# Patient Record
Sex: FEMALE | Race: BLACK OR AFRICAN AMERICAN | NOT HISPANIC OR LATINO | Employment: STUDENT | RURAL
[De-identification: names, ages, dates, MRNs, and addresses within clinical notes are randomized per-mention and may not be internally consistent; named-entity substitution may affect disease eponyms.]

---
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
## 2022-01-26 ENCOUNTER — OFFICE VISIT (OUTPATIENT)
Dept: FAMILY MEDICINE | Facility: CLINIC | Age: 22
End: 2022-01-26
Payer: COMMERCIAL

## 2022-01-26 VITALS
SYSTOLIC BLOOD PRESSURE: 112 MMHG | HEART RATE: 71 BPM | OXYGEN SATURATION: 98 % | HEIGHT: 64 IN | DIASTOLIC BLOOD PRESSURE: 58 MMHG | TEMPERATURE: 99 F | WEIGHT: 156 LBS | BODY MASS INDEX: 26.63 KG/M2

## 2022-01-26 DIAGNOSIS — Z13.1 SCREENING FOR DIABETES MELLITUS: ICD-10-CM

## 2022-01-26 DIAGNOSIS — Z13.220 SCREENING FOR LIPOID DISORDERS: ICD-10-CM

## 2022-01-26 DIAGNOSIS — Z00.00 WELL ADULT EXAM: Primary | ICD-10-CM

## 2022-01-26 DIAGNOSIS — Z11.3 SCREENING FOR STDS (SEXUALLY TRANSMITTED DISEASES): ICD-10-CM

## 2022-01-26 LAB
CHOLEST SERPL-MCNC: 179 MG/DL (ref 0–200)
CHOLEST/HDLC SERPL: 2 {RATIO}
GLUCOSE SERPL-MCNC: 89 MG/DL (ref 74–106)
HDLC SERPL-MCNC: 91 MG/DL (ref 40–60)
HIV 1+O+2 AB SERPL QL: NORMAL
LDLC SERPL CALC-MCNC: 81 MG/DL
LDLC/HDLC SERPL: 0.9 {RATIO}
NONHDLC SERPL-MCNC: 88 MG/DL
SYPHILIS AB INTERPRETATION: NORMAL
TRIGL SERPL-MCNC: 34 MG/DL (ref 35–150)
VLDLC SERPL-MCNC: 7 MG/DL

## 2022-01-26 PROCEDURE — 86780 TREPONEMA PALLIDUM (SYPHILIS) ANTIBODY: ICD-10-PCS | Mod: ,,, | Performed by: CLINICAL MEDICAL LABORATORY

## 2022-01-26 PROCEDURE — 3078F DIAST BP <80 MM HG: CPT | Mod: CPTII,,, | Performed by: NURSE PRACTITIONER

## 2022-01-26 PROCEDURE — 86695 HERPES SIMPLEX 1 & 2 IGG: ICD-10-PCS | Mod: ,,, | Performed by: CLINICAL MEDICAL LABORATORY

## 2022-01-26 PROCEDURE — 86694 HERPES SIMPLEX NES ANTBDY: CPT | Mod: ,,, | Performed by: CLINICAL MEDICAL LABORATORY

## 2022-01-26 PROCEDURE — 80061 LIPID PANEL: ICD-10-PCS | Mod: ,,, | Performed by: CLINICAL MEDICAL LABORATORY

## 2022-01-26 PROCEDURE — 87491 CHLMYD TRACH DNA AMP PROBE: CPT | Mod: ,,, | Performed by: CLINICAL MEDICAL LABORATORY

## 2022-01-26 PROCEDURE — 80061 LIPID PANEL: CPT | Mod: ,,, | Performed by: CLINICAL MEDICAL LABORATORY

## 2022-01-26 PROCEDURE — 87389 HIV-1 AG W/HIV-1&-2 AB AG IA: CPT | Mod: ,,, | Performed by: CLINICAL MEDICAL LABORATORY

## 2022-01-26 PROCEDURE — 86780 TREPONEMA PALLIDUM: CPT | Mod: ,,, | Performed by: CLINICAL MEDICAL LABORATORY

## 2022-01-26 PROCEDURE — 99395 PREV VISIT EST AGE 18-39: CPT | Mod: ,,, | Performed by: NURSE PRACTITIONER

## 2022-01-26 PROCEDURE — 87389 HIV 1 / 2 ANTIBODY: ICD-10-PCS | Mod: ,,, | Performed by: CLINICAL MEDICAL LABORATORY

## 2022-01-26 PROCEDURE — 86695 HERPES SIMPLEX TYPE 1 TEST: CPT | Mod: ,,, | Performed by: CLINICAL MEDICAL LABORATORY

## 2022-01-26 PROCEDURE — 86694 HERPES SIMPLEX 1 & 2 IGM: ICD-10-PCS | Mod: ,,, | Performed by: CLINICAL MEDICAL LABORATORY

## 2022-01-26 PROCEDURE — 86696 HERPES SIMPLEX TYPE 2 TEST: CPT | Mod: ,,, | Performed by: CLINICAL MEDICAL LABORATORY

## 2022-01-26 PROCEDURE — 87591 N.GONORRHOEAE DNA AMP PROB: CPT | Mod: ,,, | Performed by: CLINICAL MEDICAL LABORATORY

## 2022-01-26 PROCEDURE — 1159F MED LIST DOCD IN RCRD: CPT | Mod: CPTII,,, | Performed by: NURSE PRACTITIONER

## 2022-01-26 PROCEDURE — 99395 PR PREVENTIVE VISIT,EST,18-39: ICD-10-PCS | Mod: ,,, | Performed by: NURSE PRACTITIONER

## 2022-01-26 PROCEDURE — 82947 ASSAY GLUCOSE BLOOD QUANT: CPT | Mod: ,,, | Performed by: CLINICAL MEDICAL LABORATORY

## 2022-01-26 PROCEDURE — 3008F PR BODY MASS INDEX (BMI) DOCUMENTED: ICD-10-PCS | Mod: CPTII,,, | Performed by: NURSE PRACTITIONER

## 2022-01-26 PROCEDURE — 87491 CHLAMYDIA/GONORRHOEAE(GC), PCR: ICD-10-PCS | Mod: ,,, | Performed by: CLINICAL MEDICAL LABORATORY

## 2022-01-26 PROCEDURE — 3074F PR MOST RECENT SYSTOLIC BLOOD PRESSURE < 130 MM HG: ICD-10-PCS | Mod: CPTII,,, | Performed by: NURSE PRACTITIONER

## 2022-01-26 PROCEDURE — 3078F PR MOST RECENT DIASTOLIC BLOOD PRESSURE < 80 MM HG: ICD-10-PCS | Mod: CPTII,,, | Performed by: NURSE PRACTITIONER

## 2022-01-26 PROCEDURE — 1160F RVW MEDS BY RX/DR IN RCRD: CPT | Mod: CPTII,,, | Performed by: NURSE PRACTITIONER

## 2022-01-26 PROCEDURE — 1160F PR REVIEW ALL MEDS BY PRESCRIBER/CLIN PHARMACIST DOCUMENTED: ICD-10-PCS | Mod: CPTII,,, | Performed by: NURSE PRACTITIONER

## 2022-01-26 PROCEDURE — 1159F PR MEDICATION LIST DOCUMENTED IN MEDICAL RECORD: ICD-10-PCS | Mod: CPTII,,, | Performed by: NURSE PRACTITIONER

## 2022-01-26 PROCEDURE — 86696 HERPES SIMPLEX 1 & 2 IGG: ICD-10-PCS | Mod: ,,, | Performed by: CLINICAL MEDICAL LABORATORY

## 2022-01-26 PROCEDURE — 82947 GLUCOSE, FASTING: ICD-10-PCS | Mod: ,,, | Performed by: CLINICAL MEDICAL LABORATORY

## 2022-01-26 PROCEDURE — 3008F BODY MASS INDEX DOCD: CPT | Mod: CPTII,,, | Performed by: NURSE PRACTITIONER

## 2022-01-26 PROCEDURE — 3074F SYST BP LT 130 MM HG: CPT | Mod: CPTII,,, | Performed by: NURSE PRACTITIONER

## 2022-01-26 PROCEDURE — 87591 CHLAMYDIA/GONORRHOEAE(GC), PCR: ICD-10-PCS | Mod: ,,, | Performed by: CLINICAL MEDICAL LABORATORY

## 2022-01-26 NOTE — PROGRESS NOTES
Subjective:       Patient ID: Chloe Wallace is a 21 y.o. female.    Chief Complaint: Annual Exam (Check up/ annual; blood work, STD (no symptoms), fasting)    22yo AAF presents to clinic for annual wellness exam and would like a full STD screening including HIV testing.  She denies any complaints or issues.    Review of Systems   Constitutional: Negative for fatigue.   HENT: Negative for ear pain and sore throat.    Eyes: Negative for pain, discharge and visual disturbance.   Respiratory: Negative for cough and shortness of breath.    Cardiovascular: Negative.    Gastrointestinal: Negative for abdominal pain, constipation and diarrhea.   Genitourinary: Negative for dysuria, menstrual problem and vaginal discharge.   Musculoskeletal: Negative for gait problem.   Skin: Negative.    Neurological: Negative for dizziness and light-headedness.         Objective:      Physical Exam  Vitals and nursing note reviewed.   Constitutional:       Appearance: Normal appearance. She is well-developed.   HENT:      Head: Normocephalic.      Right Ear: Hearing normal.      Left Ear: Hearing normal.      Nose: Nose normal.   Eyes:      General: Lids are normal.      Extraocular Movements: Extraocular movements intact.      Conjunctiva/sclera: Conjunctivae normal.      Pupils: Pupils are equal, round, and reactive to light.   Cardiovascular:      Rate and Rhythm: Normal rate and regular rhythm.      Heart sounds: Normal heart sounds.   Pulmonary:      Effort: Pulmonary effort is normal.      Breath sounds: Normal breath sounds.   Musculoskeletal:         General: Normal range of motion.      Cervical back: Normal range of motion and neck supple.      Right lower leg: No edema.      Left lower leg: No edema.   Skin:     General: Skin is warm and dry.   Neurological:      Mental Status: She is alert and oriented to person, place, and time.      Gait: Gait is intact.   Psychiatric:         Behavior: Behavior is cooperative.             Assessment:       Well adult exam    Screening for lipoid disorders  -     Lipid Panel; Future; Expected date: 01/26/2022    Screening for diabetes mellitus  -     Glucose, Fasting; Future; Expected date: 01/26/2022    Screening for STDs (sexually transmitted diseases)  -     Chlamydia/GC, PCR; Future; Expected date: 01/26/2022  -     HIV 1/2 Ag/Ab (4th Gen); Future; Expected date: 01/26/2022  -     Treponema Pallidum (Syphillis) Antibody; Future; Expected date: 01/26/2022  -     HSV 1 & 2, IgM; Future; Expected date: 01/26/2022  -     HSV 1 & 2, IgG; Future; Expected date: 01/26/2022             Plan:     Follow up if symptoms worsen or fail to improve.       Reviewed healthy diet and lifestyle modifications    Discussed safe sex practices

## 2022-01-27 ENCOUNTER — TELEPHONE (OUTPATIENT)
Dept: FAMILY MEDICINE | Facility: CLINIC | Age: 22
End: 2022-01-27
Payer: COMMERCIAL

## 2022-01-27 LAB
CHLAMYDIA BY PCR: NEGATIVE
HSV IGM SER QL IA: NEGATIVE
HSV TYPE 1 AB IGG INDEX: >8
HSV TYPE 2 AB IGG INDEX: 0.2
HSV1 IGG SER QL: POSITIVE
HSV2 IGG SER QL: NEGATIVE
N. GONORRHOEAE (GC) BY PCR: NEGATIVE

## 2022-01-27 NOTE — TELEPHONE ENCOUNTER
----- Message from GLENNA Quiroz sent at 1/27/2022  2:06 PM CST -----  Patient is positive for HSV I. All other labs are good.

## 2022-04-07 ENCOUNTER — OFFICE VISIT (OUTPATIENT)
Dept: GASTROENTEROLOGY | Facility: CLINIC | Age: 22
End: 2022-04-07
Payer: COMMERCIAL

## 2022-04-07 VITALS
HEIGHT: 64 IN | WEIGHT: 158 LBS | DIASTOLIC BLOOD PRESSURE: 74 MMHG | HEART RATE: 72 BPM | OXYGEN SATURATION: 97 % | SYSTOLIC BLOOD PRESSURE: 126 MMHG | BODY MASS INDEX: 26.98 KG/M2

## 2022-04-07 DIAGNOSIS — K59.00 CONSTIPATION, UNSPECIFIED CONSTIPATION TYPE: Primary | ICD-10-CM

## 2022-04-07 DIAGNOSIS — K92.1 HEMATOCHEZIA: ICD-10-CM

## 2022-04-07 PROCEDURE — 99214 OFFICE O/P EST MOD 30 MIN: CPT | Mod: ,,, | Performed by: NURSE PRACTITIONER

## 2022-04-07 PROCEDURE — 1159F MED LIST DOCD IN RCRD: CPT | Mod: CPTII,,, | Performed by: NURSE PRACTITIONER

## 2022-04-07 PROCEDURE — 1159F PR MEDICATION LIST DOCUMENTED IN MEDICAL RECORD: ICD-10-PCS | Mod: CPTII,,, | Performed by: NURSE PRACTITIONER

## 2022-04-07 PROCEDURE — 3008F PR BODY MASS INDEX (BMI) DOCUMENTED: ICD-10-PCS | Mod: CPTII,,, | Performed by: NURSE PRACTITIONER

## 2022-04-07 PROCEDURE — 99214 PR OFFICE/OUTPT VISIT, EST, LEVL IV, 30-39 MIN: ICD-10-PCS | Mod: ,,, | Performed by: NURSE PRACTITIONER

## 2022-04-07 PROCEDURE — 3074F SYST BP LT 130 MM HG: CPT | Mod: CPTII,,, | Performed by: NURSE PRACTITIONER

## 2022-04-07 PROCEDURE — 3074F PR MOST RECENT SYSTOLIC BLOOD PRESSURE < 130 MM HG: ICD-10-PCS | Mod: CPTII,,, | Performed by: NURSE PRACTITIONER

## 2022-04-07 PROCEDURE — 3078F DIAST BP <80 MM HG: CPT | Mod: CPTII,,, | Performed by: NURSE PRACTITIONER

## 2022-04-07 PROCEDURE — 3078F PR MOST RECENT DIASTOLIC BLOOD PRESSURE < 80 MM HG: ICD-10-PCS | Mod: CPTII,,, | Performed by: NURSE PRACTITIONER

## 2022-04-07 PROCEDURE — 3008F BODY MASS INDEX DOCD: CPT | Mod: CPTII,,, | Performed by: NURSE PRACTITIONER

## 2022-04-07 NOTE — PROGRESS NOTES
Chloe Wallace is a 21 y.o. female here for Constipation        PCP: Primary Doctor No  Referring Provider: No referring provider defined for this encounter.     HPI:  Presents for report of constipation. She is not taking a regular laxative. States she averages a bowel movement every 4 days. Reports intermittent hematochezia with straining to have a BM. No abdominal pain, weight loss, change in appetite or fatigue. Family history of colon cancer in her father. Father diagnosed at age 45. Also reports a grandmother and aunt with colon cancer. No history of IBD. Discussed flexible sigmoidoscopy for rectal bleeding. Patient declines at this time.         ROS:  Review of Systems   Constitutional: Negative for appetite change, fatigue, fever and unexpected weight change.   HENT: Negative for trouble swallowing.    Respiratory: Negative for shortness of breath and wheezing.    Cardiovascular: Negative for chest pain and palpitations.   Gastrointestinal: Positive for constipation. Negative for abdominal pain, blood in stool, change in bowel habit, diarrhea, nausea, vomiting, reflux and change in bowel habit.   Genitourinary: Negative for dysuria.   Musculoskeletal: Negative for back pain, gait problem and joint swelling.   Integumentary:  Negative for pallor.   Neurological: Negative for dizziness, weakness and light-headedness.   Psychiatric/Behavioral: The patient is not nervous/anxious.           PMHX:  has a past medical history of Perirectal abscess and Pilonidal abscess.    PSHX:  has a past surgical history that includes [place] tooth extraction and Incision and drainage of abscess (Right, 6/15/2021).    PFHX: family history includes Cancer in her father and paternal aunt.    PSlHX:  reports that she has never smoked. She has never used smokeless tobacco. She reports that she does not drink alcohol and does not use drugs.        Review of patient's allergies indicates:  No Known Allergies         Objective  "Findings:  Vital Signs:  /74   Pulse 72   Ht 5' 4" (1.626 m)   Wt 71.7 kg (158 lb)   LMP 04/06/2022   SpO2 97%   BMI 27.12 kg/m²  Body mass index is 27.12 kg/m².    Physical Exam:  Physical Exam  Vitals and nursing note reviewed.   Constitutional:       General: She is not in acute distress.     Appearance: Normal appearance. She is not ill-appearing.   HENT:      Mouth/Throat:      Mouth: Mucous membranes are moist.   Cardiovascular:      Rate and Rhythm: Normal rate and regular rhythm.      Heart sounds: No murmur heard.  Pulmonary:      Effort: Pulmonary effort is normal.      Breath sounds: No wheezing, rhonchi or rales.   Abdominal:      General: Bowel sounds are normal. There is no distension.      Palpations: Abdomen is soft. There is no mass.      Tenderness: There is no abdominal tenderness. There is no guarding or rebound.      Hernia: No hernia is present.   Musculoskeletal:      Right lower leg: No edema.      Left lower leg: No edema.   Skin:     General: Skin is warm and dry.      Coloration: Skin is not jaundiced or pale.      Findings: No bruising or rash.   Neurological:      Mental Status: She is alert and oriented to person, place, and time.   Psychiatric:         Mood and Affect: Mood normal.          Labs:  No results found for: WBC, HGB, HCT, MCV, RDW, PLT, GRAN, LYMPH, MONO, EOS, BASO  No results found for: NA, K, CL, CO2, GLU, BUN, CREATININE, CALCIUM, PROT, ALBUMIN, BILITOT, ALKPHOS, AST, ALT      Imaging: No results found.      Assessment:  Chloe Wallace is a 21 y.o. female here with:  1. Constipation, unspecified constipation type    2. Hematochezia          Recommendations:  1. TSH, Free t4, CBC, BMP  2. Miralax powder 17 grams in 8 ounces of liquid daily. On the 3rd day if no BM, MOM as directed  3. Offered Flex sig. Patient declines at this time  Drink 64 ounces of water daily  Exercise 150 minutes per week      Follow up in about 3 months (around 7/7/2022).      Order " summary:  Orders Placed This Encounter    T4, Free    CBC Auto Differential    Basic Metabolic Panel    TSH       Thank you for allowing me to participate in the care of Chloe Wallace.      CARROLL Joyce

## 2022-04-07 NOTE — PATIENT INSTRUCTIONS
Miralax 1 capful in 8 ounces liquid daily on 3rd day you can take Milk of Magnesia 2-3 tablets daily  Increase water intake to 64 ounces    Eat Kiwi fruit for constipation

## 2022-06-04 ENCOUNTER — OFFICE VISIT (OUTPATIENT)
Dept: FAMILY MEDICINE | Facility: CLINIC | Age: 22
End: 2022-06-04
Payer: COMMERCIAL

## 2022-06-04 VITALS
BODY MASS INDEX: 26.46 KG/M2 | OXYGEN SATURATION: 100 % | HEART RATE: 64 BPM | TEMPERATURE: 98 F | RESPIRATION RATE: 17 BRPM | WEIGHT: 155 LBS | SYSTOLIC BLOOD PRESSURE: 119 MMHG | HEIGHT: 64 IN | DIASTOLIC BLOOD PRESSURE: 83 MMHG

## 2022-06-04 DIAGNOSIS — B96.89 BV (BACTERIAL VAGINOSIS): ICD-10-CM

## 2022-06-04 DIAGNOSIS — N76.0 BV (BACTERIAL VAGINOSIS): ICD-10-CM

## 2022-06-04 DIAGNOSIS — Z11.3 SCREENING EXAMINATION FOR VENEREAL DISEASE: Primary | ICD-10-CM

## 2022-06-04 LAB
B-HCG UR QL: NEGATIVE
BILIRUB SERPL-MCNC: NEGATIVE MG/DL
BLOOD URINE, POC: NEGATIVE
CANDIDA SPECIES: NEGATIVE
CHLAMYDIA BY PCR: NEGATIVE
COLOR, POC UA: YELLOW
CTP QC/QA: YES
GARDNERELLA: POSITIVE
GLUCOSE UR QL STRIP: NEGATIVE
HIV 1+O+2 AB SERPL QL: NORMAL
KETONES UR QL STRIP: NEGATIVE
LEUKOCYTE ESTERASE URINE, POC: NEGATIVE
N. GONORRHOEAE (GC) BY PCR: NEGATIVE
NITRITE, POC UA: NEGATIVE
PH, POC UA: 7.5
PROTEIN, POC: NEGATIVE
SPECIFIC GRAVITY, POC UA: 1.02
SYPHILIS AB INTERPRETATION: NORMAL
TRICHOMONAS: NEGATIVE
UROBILINOGEN, POC UA: 0.2

## 2022-06-04 PROCEDURE — 86694 HERPES SIMPLEX 1 & 2 IGM: ICD-10-PCS | Mod: ,,, | Performed by: CLINICAL MEDICAL LABORATORY

## 2022-06-04 PROCEDURE — 86780 TREPONEMA PALLIDUM: CPT | Mod: ,,, | Performed by: CLINICAL MEDICAL LABORATORY

## 2022-06-04 PROCEDURE — 87510 GARDNER VAG DNA DIR PROBE: CPT | Mod: ,,, | Performed by: CLINICAL MEDICAL LABORATORY

## 2022-06-04 PROCEDURE — 86780 TREPONEMA PALLIDUM (SYPHILIS) ANTIBODY: ICD-10-PCS | Mod: ,,, | Performed by: CLINICAL MEDICAL LABORATORY

## 2022-06-04 PROCEDURE — 86696 HERPES SIMPLEX 1 & 2 IGG: ICD-10-PCS | Mod: ,,, | Performed by: CLINICAL MEDICAL LABORATORY

## 2022-06-04 PROCEDURE — 3008F PR BODY MASS INDEX (BMI) DOCUMENTED: ICD-10-PCS | Mod: CPTII,,, | Performed by: NURSE PRACTITIONER

## 2022-06-04 PROCEDURE — 86694 HERPES SIMPLEX NES ANTBDY: CPT | Mod: ,,, | Performed by: CLINICAL MEDICAL LABORATORY

## 2022-06-04 PROCEDURE — 81025 URINE PREGNANCY TEST: CPT | Mod: QW,,, | Performed by: NURSE PRACTITIONER

## 2022-06-04 PROCEDURE — 99213 OFFICE O/P EST LOW 20 MIN: CPT | Mod: ,,, | Performed by: NURSE PRACTITIONER

## 2022-06-04 PROCEDURE — 3079F PR MOST RECENT DIASTOLIC BLOOD PRESSURE 80-89 MM HG: ICD-10-PCS | Mod: CPTII,,, | Performed by: NURSE PRACTITIONER

## 2022-06-04 PROCEDURE — 86696 HERPES SIMPLEX TYPE 2 TEST: CPT | Mod: ,,, | Performed by: CLINICAL MEDICAL LABORATORY

## 2022-06-04 PROCEDURE — 87510 BACTERIAL VAGINOSIS: ICD-10-PCS | Mod: ,,, | Performed by: CLINICAL MEDICAL LABORATORY

## 2022-06-04 PROCEDURE — 86695 HERPES SIMPLEX 1 & 2 IGG: ICD-10-PCS | Mod: ,,, | Performed by: CLINICAL MEDICAL LABORATORY

## 2022-06-04 PROCEDURE — 81025 POCT URINE PREGNANCY: ICD-10-PCS | Mod: QW,,, | Performed by: NURSE PRACTITIONER

## 2022-06-04 PROCEDURE — 99213 PR OFFICE/OUTPT VISIT, EST, LEVL III, 20-29 MIN: ICD-10-PCS | Mod: ,,, | Performed by: NURSE PRACTITIONER

## 2022-06-04 PROCEDURE — 87389 HIV-1 AG W/HIV-1&-2 AB AG IA: CPT | Mod: ,,, | Performed by: CLINICAL MEDICAL LABORATORY

## 2022-06-04 PROCEDURE — 87491 CHLMYD TRACH DNA AMP PROBE: CPT | Mod: ,,, | Performed by: CLINICAL MEDICAL LABORATORY

## 2022-06-04 PROCEDURE — 3008F BODY MASS INDEX DOCD: CPT | Mod: CPTII,,, | Performed by: NURSE PRACTITIONER

## 2022-06-04 PROCEDURE — 87591 N.GONORRHOEAE DNA AMP PROB: CPT | Mod: ,,, | Performed by: CLINICAL MEDICAL LABORATORY

## 2022-06-04 PROCEDURE — 81003 URINALYSIS AUTO W/O SCOPE: CPT | Mod: QW,,, | Performed by: NURSE PRACTITIONER

## 2022-06-04 PROCEDURE — 99051 PR MEDICAL SERVICES, EVE/WKEND/HOLIDAY: ICD-10-PCS | Mod: ,,, | Performed by: NURSE PRACTITIONER

## 2022-06-04 PROCEDURE — 99051 MED SERV EVE/WKEND/HOLIDAY: CPT | Mod: ,,, | Performed by: NURSE PRACTITIONER

## 2022-06-04 PROCEDURE — 3079F DIAST BP 80-89 MM HG: CPT | Mod: CPTII,,, | Performed by: NURSE PRACTITIONER

## 2022-06-04 PROCEDURE — 87660 TRICHOMONAS VAGIN DIR PROBE: CPT | Mod: ,,, | Performed by: CLINICAL MEDICAL LABORATORY

## 2022-06-04 PROCEDURE — 87389 HIV 1 / 2 ANTIBODY: ICD-10-PCS | Mod: ,,, | Performed by: CLINICAL MEDICAL LABORATORY

## 2022-06-04 PROCEDURE — 87480 CANDIDA DNA DIR PROBE: CPT | Mod: ,,, | Performed by: CLINICAL MEDICAL LABORATORY

## 2022-06-04 PROCEDURE — 87491 CHLAMYDIA/GONORRHOEAE(GC), PCR: ICD-10-PCS | Mod: ,,, | Performed by: CLINICAL MEDICAL LABORATORY

## 2022-06-04 PROCEDURE — 87480 BACTERIAL VAGINOSIS: ICD-10-PCS | Mod: ,,, | Performed by: CLINICAL MEDICAL LABORATORY

## 2022-06-04 PROCEDURE — 3074F PR MOST RECENT SYSTOLIC BLOOD PRESSURE < 130 MM HG: ICD-10-PCS | Mod: CPTII,,, | Performed by: NURSE PRACTITIONER

## 2022-06-04 PROCEDURE — 86695 HERPES SIMPLEX TYPE 1 TEST: CPT | Mod: ,,, | Performed by: CLINICAL MEDICAL LABORATORY

## 2022-06-04 PROCEDURE — 81003 POCT URINALYSIS W/O SCOPE: ICD-10-PCS | Mod: QW,,, | Performed by: NURSE PRACTITIONER

## 2022-06-04 PROCEDURE — 3074F SYST BP LT 130 MM HG: CPT | Mod: CPTII,,, | Performed by: NURSE PRACTITIONER

## 2022-06-04 PROCEDURE — 87591 CHLAMYDIA/GONORRHOEAE(GC), PCR: ICD-10-PCS | Mod: ,,, | Performed by: CLINICAL MEDICAL LABORATORY

## 2022-06-04 PROCEDURE — 87660 BACTERIAL VAGINOSIS: ICD-10-PCS | Mod: ,,, | Performed by: CLINICAL MEDICAL LABORATORY

## 2022-06-06 ENCOUNTER — TELEPHONE (OUTPATIENT)
Dept: FAMILY MEDICINE | Facility: CLINIC | Age: 22
End: 2022-06-06
Payer: COMMERCIAL

## 2022-06-06 RX ORDER — METRONIDAZOLE 500 MG/1
500 TABLET ORAL EVERY 12 HOURS
Qty: 14 TABLET | Refills: 0 | Status: SHIPPED | OUTPATIENT
Start: 2022-06-06 | End: 2022-06-13

## 2022-06-06 NOTE — TELEPHONE ENCOUNTER
Identify patient by name and . Results was given. Responded well and voiced understanding.----- Message from GLENNA Balderas sent at 2022  8:19 AM CDT -----  Please notify- I sent in flagyl to treat

## 2022-06-07 LAB
HSV IGM SER QL IA: NEGATIVE
HSV TYPE 1 AB IGG INDEX: 8.66
HSV TYPE 2 AB IGG INDEX: 0.09
HSV1 IGG SER QL: POSITIVE
HSV2 IGG SER QL: NEGATIVE

## 2023-03-09 ENCOUNTER — OFFICE VISIT (OUTPATIENT)
Dept: FAMILY MEDICINE | Facility: CLINIC | Age: 23
End: 2023-03-09
Payer: COMMERCIAL

## 2023-03-09 VITALS
RESPIRATION RATE: 16 BRPM | OXYGEN SATURATION: 98 % | SYSTOLIC BLOOD PRESSURE: 112 MMHG | TEMPERATURE: 98 F | WEIGHT: 156 LBS | BODY MASS INDEX: 26.63 KG/M2 | DIASTOLIC BLOOD PRESSURE: 72 MMHG | HEART RATE: 60 BPM | HEIGHT: 64 IN

## 2023-03-09 DIAGNOSIS — N89.8 VAGINAL DISCHARGE: Primary | ICD-10-CM

## 2023-03-09 DIAGNOSIS — N89.8 VAGINAL IRRITATION: ICD-10-CM

## 2023-03-09 LAB
BILIRUB SERPL-MCNC: NEGATIVE MG/DL
BLOOD URINE, POC: NEGATIVE
CANDIDA SPECIES: NEGATIVE
COLOR, POC UA: YELLOW
GARDNERELLA: POSITIVE
GLUCOSE UR QL STRIP: NEGATIVE
KETONES UR QL STRIP: NEGATIVE
LEUKOCYTE ESTERASE URINE, POC: NEGATIVE
NITRITE, POC UA: NEGATIVE
PH, POC UA: 6.5
PROTEIN, POC: NEGATIVE
SPECIFIC GRAVITY, POC UA: 1.02
TRICHOMONAS: NEGATIVE
UROBILINOGEN, POC UA: 0.2

## 2023-03-09 PROCEDURE — 3008F PR BODY MASS INDEX (BMI) DOCUMENTED: ICD-10-PCS | Mod: ,,, | Performed by: NURSE PRACTITIONER

## 2023-03-09 PROCEDURE — 3074F SYST BP LT 130 MM HG: CPT | Mod: ,,, | Performed by: NURSE PRACTITIONER

## 2023-03-09 PROCEDURE — 3078F PR MOST RECENT DIASTOLIC BLOOD PRESSURE < 80 MM HG: ICD-10-PCS | Mod: ,,, | Performed by: NURSE PRACTITIONER

## 2023-03-09 PROCEDURE — 87510 GARDNER VAG DNA DIR PROBE: CPT | Mod: ,,, | Performed by: CLINICAL MEDICAL LABORATORY

## 2023-03-09 PROCEDURE — 1160F RVW MEDS BY RX/DR IN RCRD: CPT | Mod: ,,, | Performed by: NURSE PRACTITIONER

## 2023-03-09 PROCEDURE — 87491 CHLAMYDIA/GONORRHOEAE(GC), PCR: ICD-10-PCS | Mod: ,,, | Performed by: CLINICAL MEDICAL LABORATORY

## 2023-03-09 PROCEDURE — 87591 CHLAMYDIA/GONORRHOEAE(GC), PCR: ICD-10-PCS | Mod: ,,, | Performed by: CLINICAL MEDICAL LABORATORY

## 2023-03-09 PROCEDURE — 3008F BODY MASS INDEX DOCD: CPT | Mod: ,,, | Performed by: NURSE PRACTITIONER

## 2023-03-09 PROCEDURE — 1160F PR REVIEW ALL MEDS BY PRESCRIBER/CLIN PHARMACIST DOCUMENTED: ICD-10-PCS | Mod: ,,, | Performed by: NURSE PRACTITIONER

## 2023-03-09 PROCEDURE — 87660 TRICHOMONAS VAGIN DIR PROBE: CPT | Mod: ,,, | Performed by: CLINICAL MEDICAL LABORATORY

## 2023-03-09 PROCEDURE — 87510 BACTERIAL VAGINOSIS: ICD-10-PCS | Mod: ,,, | Performed by: CLINICAL MEDICAL LABORATORY

## 2023-03-09 PROCEDURE — 1159F PR MEDICATION LIST DOCUMENTED IN MEDICAL RECORD: ICD-10-PCS | Mod: ,,, | Performed by: NURSE PRACTITIONER

## 2023-03-09 PROCEDURE — 87491 CHLMYD TRACH DNA AMP PROBE: CPT | Mod: ,,, | Performed by: CLINICAL MEDICAL LABORATORY

## 2023-03-09 PROCEDURE — 87480 CANDIDA DNA DIR PROBE: CPT | Mod: ,,, | Performed by: CLINICAL MEDICAL LABORATORY

## 2023-03-09 PROCEDURE — 87480 BACTERIAL VAGINOSIS: ICD-10-PCS | Mod: ,,, | Performed by: CLINICAL MEDICAL LABORATORY

## 2023-03-09 PROCEDURE — 1159F MED LIST DOCD IN RCRD: CPT | Mod: ,,, | Performed by: NURSE PRACTITIONER

## 2023-03-09 PROCEDURE — 87591 N.GONORRHOEAE DNA AMP PROB: CPT | Mod: ,,, | Performed by: CLINICAL MEDICAL LABORATORY

## 2023-03-09 PROCEDURE — 81003 POCT URINALYSIS W/O SCOPE: ICD-10-PCS | Mod: QW,,, | Performed by: NURSE PRACTITIONER

## 2023-03-09 PROCEDURE — 87660 BACTERIAL VAGINOSIS: ICD-10-PCS | Mod: ,,, | Performed by: CLINICAL MEDICAL LABORATORY

## 2023-03-09 PROCEDURE — 99214 OFFICE O/P EST MOD 30 MIN: CPT | Mod: ,,, | Performed by: NURSE PRACTITIONER

## 2023-03-09 PROCEDURE — 81003 URINALYSIS AUTO W/O SCOPE: CPT | Mod: QW,,, | Performed by: NURSE PRACTITIONER

## 2023-03-09 PROCEDURE — 3078F DIAST BP <80 MM HG: CPT | Mod: ,,, | Performed by: NURSE PRACTITIONER

## 2023-03-09 PROCEDURE — 99214 PR OFFICE/OUTPT VISIT, EST, LEVL IV, 30-39 MIN: ICD-10-PCS | Mod: ,,, | Performed by: NURSE PRACTITIONER

## 2023-03-09 PROCEDURE — 3074F PR MOST RECENT SYSTOLIC BLOOD PRESSURE < 130 MM HG: ICD-10-PCS | Mod: ,,, | Performed by: NURSE PRACTITIONER

## 2023-03-09 RX ORDER — METRONIDAZOLE 500 MG/1
500 TABLET ORAL 2 TIMES DAILY
Qty: 14 TABLET | Refills: 0 | Status: SHIPPED | OUTPATIENT
Start: 2023-03-09 | End: 2023-03-16

## 2023-03-09 NOTE — PROGRESS NOTES
"Subjective:       Patient ID: Chloe Wallace is a 22 y.o. female.    Chief Complaint: Vaginal Discharge (Complaint of irritation and slight odor; white discharge x2weeks)    Presents to clinic as above. LMP 3/1/23 and normal. Does not want blood drawn. Just wants GC/Chlamydia and affirm.     Review of Systems   Constitutional: Negative.    Respiratory: Negative.     Cardiovascular: Negative.    Gastrointestinal:  Negative for abdominal pain.   Genitourinary: Negative.    Skin: Negative.         Reviewed family, medical, surgical, and social history.    Objective:      /72   Pulse 60   Temp 98.2 °F (36.8 °C)   Resp 16   Ht 5' 4" (1.626 m)   Wt 70.8 kg (156 lb)   LMP 03/02/2023 (Exact Date)   SpO2 98%   BMI 26.78 kg/m²   Physical Exam  Vitals and nursing note reviewed.   Constitutional:       General: She is not in acute distress.     Appearance: Normal appearance. She is not ill-appearing, toxic-appearing or diaphoretic.   Cardiovascular:      Rate and Rhythm: Normal rate and regular rhythm.      Heart sounds: Normal heart sounds.   Pulmonary:      Effort: Pulmonary effort is normal.      Breath sounds: Normal breath sounds.   Abdominal:      General: Abdomen is flat. Bowel sounds are normal. There is no distension.      Palpations: Abdomen is soft. There is no mass.      Tenderness: There is no abdominal tenderness. There is no right CVA tenderness, left CVA tenderness, guarding or rebound.      Hernia: No hernia is present.   Skin:     General: Skin is warm and dry.      Capillary Refill: Capillary refill takes less than 2 seconds.   Neurological:      Mental Status: She is alert and oriented to person, place, and time.   Psychiatric:         Mood and Affect: Mood normal.         Behavior: Behavior normal.         Thought Content: Thought content normal.         Judgment: Judgment normal.          No visits with results within 1 Day(s) from this visit.   Latest known visit with results is:   Lab " Requisition on 01/24/2023   Component Date Value Ref Range Status    COVID-19 Ag 01/24/2023 Negative  Negative, Invalid Final      Assessment:       1. Vaginal discharge    2. Vaginal irritation          Plan:       Vaginal discharge  -     Bacterial Vaginosis; Future; Expected date: 03/09/2023  -     metroNIDAZOLE (FLAGYL) 500 MG tablet; Take 1 tablet (500 mg total) by mouth 2 (two) times daily. for 7 days  Dispense: 14 tablet; Refill: 0  -     Chlamydia/GC, PCR; Future; Expected date: 03/09/2023    Vaginal irritation  -     POCT URINALYSIS W/O SCOPE  -     metroNIDAZOLE (FLAGYL) 500 MG tablet; Take 1 tablet (500 mg total) by mouth 2 (two) times daily. for 7 days  Dispense: 14 tablet; Refill: 0    I will call when results back  RTC PRN          Risks, benefits, and side effects were discussed with the patient. All questions were answered to the fullest satisfaction of the patient, and pt verbalized understanding and agreement to treatment plan. Pt was to call with any new or worsening symptoms, or present to the ER.

## 2023-03-10 LAB
CHLAMYDIA BY PCR: NEGATIVE
N. GONORRHOEAE (GC) BY PCR: NEGATIVE

## 2023-03-11 ENCOUNTER — TELEPHONE (OUTPATIENT)
Dept: FAMILY MEDICINE | Facility: CLINIC | Age: 23
End: 2023-03-11
Payer: COMMERCIAL

## 2023-03-11 NOTE — TELEPHONE ENCOUNTER
Pt viewed results on my chart.----- Message from GLENNA Sharif sent at 3/9/2023  4:10 PM CST -----  Notify of BV. She has flagyl. Thanks

## 2023-05-17 ENCOUNTER — OFFICE VISIT (OUTPATIENT)
Dept: FAMILY MEDICINE | Facility: CLINIC | Age: 23
End: 2023-05-17
Payer: COMMERCIAL

## 2023-05-17 VITALS
DIASTOLIC BLOOD PRESSURE: 78 MMHG | BODY MASS INDEX: 26.46 KG/M2 | HEIGHT: 64 IN | SYSTOLIC BLOOD PRESSURE: 115 MMHG | OXYGEN SATURATION: 98 % | WEIGHT: 155 LBS | HEART RATE: 75 BPM

## 2023-05-17 DIAGNOSIS — Z11.3 SCREENING EXAMINATION FOR STD (SEXUALLY TRANSMITTED DISEASE): Primary | ICD-10-CM

## 2023-05-17 LAB
HIV 1+O+2 AB SERPL QL: NORMAL
SYPHILIS AB INTERPRETATION: NORMAL

## 2023-05-17 PROCEDURE — 1160F PR REVIEW ALL MEDS BY PRESCRIBER/CLIN PHARMACIST DOCUMENTED: ICD-10-PCS | Mod: ,,, | Performed by: NURSE PRACTITIONER

## 2023-05-17 PROCEDURE — 99212 OFFICE O/P EST SF 10 MIN: CPT | Mod: ,,, | Performed by: NURSE PRACTITIONER

## 2023-05-17 PROCEDURE — 86696 HERPES SIMPLEX 1 & 2 IGG: ICD-10-PCS | Mod: ,,, | Performed by: CLINICAL MEDICAL LABORATORY

## 2023-05-17 PROCEDURE — 3074F SYST BP LT 130 MM HG: CPT | Mod: ,,, | Performed by: NURSE PRACTITIONER

## 2023-05-17 PROCEDURE — 1159F MED LIST DOCD IN RCRD: CPT | Mod: ,,, | Performed by: NURSE PRACTITIONER

## 2023-05-17 PROCEDURE — 1160F RVW MEDS BY RX/DR IN RCRD: CPT | Mod: ,,, | Performed by: NURSE PRACTITIONER

## 2023-05-17 PROCEDURE — 3008F BODY MASS INDEX DOCD: CPT | Mod: ,,, | Performed by: NURSE PRACTITIONER

## 2023-05-17 PROCEDURE — 86694 HERPES SIMPLEX NES ANTBDY: CPT | Mod: ,,, | Performed by: CLINICAL MEDICAL LABORATORY

## 2023-05-17 PROCEDURE — 86695 HERPES SIMPLEX TYPE 1 TEST: CPT | Mod: ,,, | Performed by: CLINICAL MEDICAL LABORATORY

## 2023-05-17 PROCEDURE — 3074F PR MOST RECENT SYSTOLIC BLOOD PRESSURE < 130 MM HG: ICD-10-PCS | Mod: ,,, | Performed by: NURSE PRACTITIONER

## 2023-05-17 PROCEDURE — 87389 HIV-1 AG W/HIV-1&-2 AB AG IA: CPT | Mod: ,,, | Performed by: CLINICAL MEDICAL LABORATORY

## 2023-05-17 PROCEDURE — 99212 PR OFFICE/OUTPT VISIT, EST, LEVL II, 10-19 MIN: ICD-10-PCS | Mod: ,,, | Performed by: NURSE PRACTITIONER

## 2023-05-17 PROCEDURE — 3078F DIAST BP <80 MM HG: CPT | Mod: ,,, | Performed by: NURSE PRACTITIONER

## 2023-05-17 PROCEDURE — 1159F PR MEDICATION LIST DOCUMENTED IN MEDICAL RECORD: ICD-10-PCS | Mod: ,,, | Performed by: NURSE PRACTITIONER

## 2023-05-17 PROCEDURE — 87491 CHLAMYDIA/GONORRHOEAE(GC), PCR: ICD-10-PCS | Mod: ,,, | Performed by: CLINICAL MEDICAL LABORATORY

## 2023-05-17 PROCEDURE — 86780 TREPONEMA PALLIDUM: CPT | Mod: ,,, | Performed by: CLINICAL MEDICAL LABORATORY

## 2023-05-17 PROCEDURE — 86694 HERPES SIMPLEX 1 & 2 IGM: ICD-10-PCS | Mod: ,,, | Performed by: CLINICAL MEDICAL LABORATORY

## 2023-05-17 PROCEDURE — 3008F PR BODY MASS INDEX (BMI) DOCUMENTED: ICD-10-PCS | Mod: ,,, | Performed by: NURSE PRACTITIONER

## 2023-05-17 PROCEDURE — 87389 HIV 1 / 2 ANTIBODY: ICD-10-PCS | Mod: ,,, | Performed by: CLINICAL MEDICAL LABORATORY

## 2023-05-17 PROCEDURE — 3078F PR MOST RECENT DIASTOLIC BLOOD PRESSURE < 80 MM HG: ICD-10-PCS | Mod: ,,, | Performed by: NURSE PRACTITIONER

## 2023-05-17 PROCEDURE — 87591 N.GONORRHOEAE DNA AMP PROB: CPT | Mod: ,,, | Performed by: CLINICAL MEDICAL LABORATORY

## 2023-05-17 PROCEDURE — 86780 TREPONEMA PALLIDUM (SYPHILIS) ANTIBODY: ICD-10-PCS | Mod: ,,, | Performed by: CLINICAL MEDICAL LABORATORY

## 2023-05-17 PROCEDURE — 86696 HERPES SIMPLEX TYPE 2 TEST: CPT | Mod: ,,, | Performed by: CLINICAL MEDICAL LABORATORY

## 2023-05-17 PROCEDURE — 86695 HERPES SIMPLEX 1 & 2 IGG: ICD-10-PCS | Mod: ,,, | Performed by: CLINICAL MEDICAL LABORATORY

## 2023-05-17 PROCEDURE — 87591 CHLAMYDIA/GONORRHOEAE(GC), PCR: ICD-10-PCS | Mod: ,,, | Performed by: CLINICAL MEDICAL LABORATORY

## 2023-05-17 PROCEDURE — 87491 CHLMYD TRACH DNA AMP PROBE: CPT | Mod: ,,, | Performed by: CLINICAL MEDICAL LABORATORY

## 2023-05-17 NOTE — PROGRESS NOTES
[facility]    Chief Complaint      Chief Complaint   Patient presents with    Exposure to STD     No known exposure pt wants to get her blood drawn fro stds and stis with no sysm       History of Present Illness      Chloe Wallace is a 22 y.o. female. She  has a past medical history of Perirectal abscess and Pilonidal abscess., who presents today for STD screening.  States no symptoms- reports recently having a wet prep done so does not need that.    Past Medical History:  Past Medical History:   Diagnosis Date    Perirectal abscess     Pilonidal abscess        Past Surgical History:   has a past surgical history that includes [place] tooth extraction and Incision and drainage of abscess (Right, 6/15/2021).    Social History:  Social History     Tobacco Use    Smoking status: Never    Smokeless tobacco: Never   Substance Use Topics    Alcohol use: Never    Drug use: Never       I personally reviewed all past medical, surgical, and social.     Review of Systems   Constitutional:  Negative for chills, fatigue and fever.   HENT:  Negative for sore throat.    Respiratory:  Negative for cough and shortness of breath.    Cardiovascular: Negative.    Gastrointestinal:  Negative for abdominal pain, nausea and vomiting.   Genitourinary:  Negative for dysuria and vaginal discharge.   Musculoskeletal:  Negative for myalgias.   Skin: Negative.       Medications:  No outpatient encounter medications on file as of 5/17/2023.     No facility-administered encounter medications on file as of 5/17/2023.       Allergies:  Review of patient's allergies indicates:  No Known Allergies    Health Maintenance:    There is no immunization history on file for this patient.   Health Maintenance   Topic Date Due    Hepatitis C Screening  Never done    HPV Vaccines (1 - 2-dose series) Never done    TETANUS VACCINE  Never done    Chlamydia Screening  03/09/2024    Lipid Panel  Completed        Physical Exam      Vital Signs  Pulse:  "75  SpO2: 98 %  BP: 115/78  Height and Weight  Height: 5' 4" (162.6 cm)  Weight: 70.3 kg (155 lb)  BSA (Calculated - sq m): 1.78 sq meters  BMI (Calculated): 26.6  Weight in (lb) to have BMI = 25: 145.3]    Physical Exam  Vitals and nursing note reviewed.   Constitutional:       Appearance: Normal appearance. She is well-developed.   HENT:      Head: Normocephalic.      Right Ear: Hearing normal.      Left Ear: Hearing normal.      Nose: Nose normal.   Eyes:      General: Lids are normal.      Conjunctiva/sclera: Conjunctivae normal.   Cardiovascular:      Rate and Rhythm: Normal rate and regular rhythm.      Heart sounds: Normal heart sounds.   Pulmonary:      Effort: Pulmonary effort is normal.      Breath sounds: Normal breath sounds.   Musculoskeletal:         General: Normal range of motion.      Cervical back: Normal range of motion and neck supple.   Skin:     General: Skin is warm and dry.   Neurological:      Mental Status: She is alert and oriented to person, place, and time.      Gait: Gait is intact.   Psychiatric:         Behavior: Behavior is cooperative.        Laboratory:  CBC:  Recent Labs   Lab 04/07/22  0853   WBC 5.46   RBC 4.41   Hemoglobin 13.1   Hematocrit 40.0   Platelet Count 291   MCV 90.7   MCH 29.7   MCHC 32.8     CMP:  Recent Labs   Lab 04/07/22  0853   Glucose 91   Calcium 9.7   Sodium 136   Potassium 4.3   CO2 27   Chloride 107   BUN 13     LIPIDS:  Recent Labs   Lab 01/26/22  0925 04/07/22  0853   TSH  --  1.090   HDL Cholesterol 91 H  --    Cholesterol 179  --    Triglycerides 34 L  --    LDL Calculated 81  --    Cholesterol/HDL Ratio (Risk Factor) 2.0  --    Non-HDL 88  --      TSH:  Recent Labs   Lab 04/07/22  0853   TSH 1.090     A1C:        Assessment/Plan     Chloe Wallace is a 22 y.o.female with:     1. Screening examination for STD (sexually transmitted disease)  - HIV 1/2 Ag/Ab (4th Gen); Future  - Treponema Pallidum (Syphillis) Antibody; Future  - HSV 1 & 2, IgM; Future  - " HSV 1 & 2, IgG; Future  - Chlamydia/GC, PCR; Future     Educated on safe sex practices  Will call with lab results      Total time spent face-to-face and non-face-to-face coordinating care for this encounter was:  10  min    Chronic conditions status updated as per HPI.  Other than changes above, cont current medications and maintain follow up with specialists.  Return to clinic prn if symptoms worsen or fail to improve.    Renetta Gilman, ADEELP  [facility]

## 2023-05-18 LAB
CHLAMYDIA BY PCR: NEGATIVE
HSV IGM SER QL IA: NEGATIVE
HSV TYPE 1 AB IGG INDEX: 6.59
HSV TYPE 2 AB IGG INDEX: 0.11
HSV1 IGG SER QL: POSITIVE
HSV2 IGG SER QL: NEGATIVE
N. GONORRHOEAE (GC) BY PCR: NEGATIVE

## 2023-05-26 ENCOUNTER — TELEPHONE (OUTPATIENT)
Dept: FAMILY MEDICINE | Facility: CLINIC | Age: 23
End: 2023-05-26
Payer: COMMERCIAL

## 2023-05-26 NOTE — TELEPHONE ENCOUNTER
----- Message from GLENNA Quiroz sent at 5/23/2023  7:11 PM CDT -----  Has HSV 1- everything else is negative

## 2023-06-07 ENCOUNTER — OFFICE VISIT (OUTPATIENT)
Dept: FAMILY MEDICINE | Facility: CLINIC | Age: 23
End: 2023-06-07
Payer: COMMERCIAL

## 2023-06-07 VITALS
TEMPERATURE: 99 F | RESPIRATION RATE: 18 BRPM | DIASTOLIC BLOOD PRESSURE: 71 MMHG | WEIGHT: 157 LBS | BODY MASS INDEX: 26.8 KG/M2 | OXYGEN SATURATION: 100 % | HEART RATE: 56 BPM | SYSTOLIC BLOOD PRESSURE: 108 MMHG | HEIGHT: 64 IN

## 2023-06-07 DIAGNOSIS — M54.50 ACUTE RIGHT-SIDED LOW BACK PAIN WITHOUT SCIATICA: ICD-10-CM

## 2023-06-07 DIAGNOSIS — R11.10 VOMITING, UNSPECIFIED VOMITING TYPE, UNSPECIFIED WHETHER NAUSEA PRESENT: Primary | ICD-10-CM

## 2023-06-07 DIAGNOSIS — K59.00 CONSTIPATION, UNSPECIFIED CONSTIPATION TYPE: ICD-10-CM

## 2023-06-07 LAB
B-HCG UR QL: NEGATIVE
BILIRUB SERPL-MCNC: NORMAL MG/DL
BLOOD URINE, POC: NORMAL
COLOR, POC UA: YELLOW
CTP QC/QA: YES
GLUCOSE UR QL STRIP: NEGATIVE
KETONES UR QL STRIP: NORMAL
LEUKOCYTE ESTERASE URINE, POC: NEGATIVE
NITRITE, POC UA: NEGATIVE
PH, POC UA: 6
PROTEIN, POC: NEGATIVE
SPECIFIC GRAVITY, POC UA: >=1.03
UROBILINOGEN, POC UA: 0.2

## 2023-06-07 PROCEDURE — 81025 URINE PREGNANCY TEST: CPT | Mod: QW,,, | Performed by: NURSE PRACTITIONER

## 2023-06-07 PROCEDURE — 3074F PR MOST RECENT SYSTOLIC BLOOD PRESSURE < 130 MM HG: ICD-10-PCS | Mod: ,,, | Performed by: NURSE PRACTITIONER

## 2023-06-07 PROCEDURE — 1159F PR MEDICATION LIST DOCUMENTED IN MEDICAL RECORD: ICD-10-PCS | Mod: ,,, | Performed by: NURSE PRACTITIONER

## 2023-06-07 PROCEDURE — 3078F PR MOST RECENT DIASTOLIC BLOOD PRESSURE < 80 MM HG: ICD-10-PCS | Mod: ,,, | Performed by: NURSE PRACTITIONER

## 2023-06-07 PROCEDURE — 99213 PR OFFICE/OUTPT VISIT, EST, LEVL III, 20-29 MIN: ICD-10-PCS | Mod: ,,, | Performed by: NURSE PRACTITIONER

## 2023-06-07 PROCEDURE — 3078F DIAST BP <80 MM HG: CPT | Mod: ,,, | Performed by: NURSE PRACTITIONER

## 2023-06-07 PROCEDURE — 1159F MED LIST DOCD IN RCRD: CPT | Mod: ,,, | Performed by: NURSE PRACTITIONER

## 2023-06-07 PROCEDURE — 3008F BODY MASS INDEX DOCD: CPT | Mod: ,,, | Performed by: NURSE PRACTITIONER

## 2023-06-07 PROCEDURE — 81003 POCT URINALYSIS W/O SCOPE: ICD-10-PCS | Mod: QW,,, | Performed by: NURSE PRACTITIONER

## 2023-06-07 PROCEDURE — 81025 POCT URINE PREGNANCY: ICD-10-PCS | Mod: QW,,, | Performed by: NURSE PRACTITIONER

## 2023-06-07 PROCEDURE — 3074F SYST BP LT 130 MM HG: CPT | Mod: ,,, | Performed by: NURSE PRACTITIONER

## 2023-06-07 PROCEDURE — 81003 URINALYSIS AUTO W/O SCOPE: CPT | Mod: QW,,, | Performed by: NURSE PRACTITIONER

## 2023-06-07 PROCEDURE — 3008F PR BODY MASS INDEX (BMI) DOCUMENTED: ICD-10-PCS | Mod: ,,, | Performed by: NURSE PRACTITIONER

## 2023-06-07 PROCEDURE — 99213 OFFICE O/P EST LOW 20 MIN: CPT | Mod: ,,, | Performed by: NURSE PRACTITIONER

## 2023-06-07 RX ORDER — POLYETHYLENE GLYCOL 3350 17 G/17G
17 POWDER, FOR SOLUTION ORAL DAILY
Qty: 289 G | Refills: 2 | Status: SHIPPED | OUTPATIENT
Start: 2023-06-07 | End: 2024-01-03

## 2023-06-07 NOTE — LETTER
June 7, 2023      [facility]  [address] [place] [address]  Phone: [phone]  Fax: [phone]       Patient: Chloe Wallace   YOB: 2000  Date of Visit: 06/07/2023    To Whom It May Concern:    Michelle Wallace  was at [facility] on 06/07/2023. The patient may return to work/school on 06/08/2023 without restrictions. If you have any questions or concerns, or if I can be of further assistance, please do not hesitate to contact me.    Sincerely,    GLENNA Balderas

## 2023-06-07 NOTE — PROGRESS NOTES
Subjective:       Patient ID: Chloe Wallace is a 22 y.o. female.    Chief Complaint: Back Pain (Lower back pain on right side ongoing since Monday. Pt states that the pain radiates to her stomach. ), Diarrhea (Started Monday, lasted for 1 day.), and Emesis (Started Monday, lasted for 1 day.)    Back Pain (Lower back pain on right side ongoing since Monday. Pt states that the pain radiates to her stomach. ), Diarrhea (Started Monday, lasted for 1 day.), and Emesis (Started Monday, lasted for 1 day.) Hx of constipation      Back Pain  Pertinent negatives include no abdominal pain, chest pain, dysuria, fever, headaches, pelvic pain or weakness.   Diarrhea   Pertinent negatives include no abdominal pain, chills, coughing, fever, headaches or vomiting.   Emesis   Pertinent negatives include no abdominal pain, chest pain, chills, coughing, diarrhea, dizziness, fever or headaches.   Review of Systems   Constitutional:  Negative for appetite change, chills, fatigue and fever.   HENT:  Negative for nasal congestion, ear pain and sore throat.    Eyes:  Negative for pain, discharge and itching.   Respiratory:  Negative for cough and shortness of breath.    Cardiovascular:  Negative for chest pain and leg swelling.   Gastrointestinal:  Negative for abdominal pain, change in bowel habit, diarrhea, nausea, vomiting and change in bowel habit.   Genitourinary:  Negative for dysuria, flank pain, frequency, genital sores, pelvic pain, urgency, vaginal discharge and vaginal pain.   Musculoskeletal:  Positive for back pain. Negative for gait problem and neck pain.   Integumentary:  Negative for rash and wound.   Allergic/Immunologic: Negative for immunocompromised state.   Neurological:  Negative for dizziness, weakness and headaches.   All other systems reviewed and are negative.      Objective:      Physical Exam  Vitals and nursing note reviewed.   Constitutional:       General: She is not in acute distress.     Appearance: Normal  appearance. She is not ill-appearing, toxic-appearing or diaphoretic.   HENT:      Head: Normocephalic.      Mouth/Throat:      Mouth: Mucous membranes are moist.   Eyes:      General: No scleral icterus.        Right eye: No discharge.         Left eye: No discharge.      Extraocular Movements: Extraocular movements intact.      Conjunctiva/sclera: Conjunctivae normal.      Pupils: Pupils are equal, round, and reactive to light.   Cardiovascular:      Rate and Rhythm: Regular rhythm. Bradycardia present.      Pulses: Normal pulses.      Heart sounds: Normal heart sounds. No murmur heard.  Pulmonary:      Effort: Pulmonary effort is normal. No respiratory distress.      Breath sounds: Normal breath sounds. No wheezing, rhonchi or rales.   Abdominal:      General: Bowel sounds are decreased.      Palpations: Abdomen is soft.      Tenderness: There is no abdominal tenderness. There is no right CVA tenderness, left CVA tenderness, guarding or rebound.   Musculoskeletal:         General: Tenderness present. Normal range of motion.      Cervical back: Neck supple. No tenderness.      Lumbar back: Tenderness present. No swelling, edema or spasms. Normal range of motion. Negative right straight leg raise test and negative left straight leg raise test.        Back:    Lymphadenopathy:      Cervical: No cervical adenopathy.   Skin:     General: Skin is warm and dry.      Capillary Refill: Capillary refill takes less than 2 seconds.      Findings: No rash.   Neurological:      Mental Status: She is alert and oriented to person, place, and time.   Psychiatric:         Mood and Affect: Mood normal.         Behavior: Behavior normal.         Thought Content: Thought content normal.         Judgment: Judgment normal.          Assessment:       1. Vomiting, unspecified vomiting type, unspecified whether nausea present        Plan:   Vomiting, unspecified vomiting type, unspecified whether nausea present  -     POCT URINALYSIS W/O  SCOPE  -     POCT urine pregnancy  -     X-Ray KUB; Future; Expected date: 06/07/2023         Risks, benefits, and side effects were discussed with the patient. All questions were answered to the fullest satisfaction of the patient, and pt verbalized understanding and agreement to treatment plan. Pt was to call with any new or worsening symptoms, or present to the ER

## 2023-09-06 ENCOUNTER — OFFICE VISIT (OUTPATIENT)
Dept: FAMILY MEDICINE | Facility: CLINIC | Age: 23
End: 2023-09-06
Payer: COMMERCIAL

## 2023-09-06 VITALS
BODY MASS INDEX: 27.71 KG/M2 | HEIGHT: 64 IN | SYSTOLIC BLOOD PRESSURE: 109 MMHG | HEART RATE: 61 BPM | TEMPERATURE: 98 F | OXYGEN SATURATION: 99 % | DIASTOLIC BLOOD PRESSURE: 71 MMHG | WEIGHT: 162.31 LBS

## 2023-09-06 DIAGNOSIS — N89.8 VAGINAL DISCHARGE: Primary | ICD-10-CM

## 2023-09-06 PROCEDURE — 87591 CHLAMYDIA/GONORRHOEAE(GC), PCR: ICD-10-PCS | Mod: ,,, | Performed by: CLINICAL MEDICAL LABORATORY

## 2023-09-06 PROCEDURE — 99213 OFFICE O/P EST LOW 20 MIN: CPT | Mod: ,,, | Performed by: STUDENT IN AN ORGANIZED HEALTH CARE EDUCATION/TRAINING PROGRAM

## 2023-09-06 PROCEDURE — 87591 N.GONORRHOEAE DNA AMP PROB: CPT | Mod: ,,, | Performed by: CLINICAL MEDICAL LABORATORY

## 2023-09-06 PROCEDURE — 3078F PR MOST RECENT DIASTOLIC BLOOD PRESSURE < 80 MM HG: ICD-10-PCS | Mod: ,,, | Performed by: STUDENT IN AN ORGANIZED HEALTH CARE EDUCATION/TRAINING PROGRAM

## 2023-09-06 PROCEDURE — 1159F PR MEDICATION LIST DOCUMENTED IN MEDICAL RECORD: ICD-10-PCS | Mod: ,,, | Performed by: STUDENT IN AN ORGANIZED HEALTH CARE EDUCATION/TRAINING PROGRAM

## 2023-09-06 PROCEDURE — 3074F SYST BP LT 130 MM HG: CPT | Mod: ,,, | Performed by: STUDENT IN AN ORGANIZED HEALTH CARE EDUCATION/TRAINING PROGRAM

## 2023-09-06 PROCEDURE — 3008F BODY MASS INDEX DOCD: CPT | Mod: ,,, | Performed by: STUDENT IN AN ORGANIZED HEALTH CARE EDUCATION/TRAINING PROGRAM

## 2023-09-06 PROCEDURE — 3078F DIAST BP <80 MM HG: CPT | Mod: ,,, | Performed by: STUDENT IN AN ORGANIZED HEALTH CARE EDUCATION/TRAINING PROGRAM

## 2023-09-06 PROCEDURE — 3008F PR BODY MASS INDEX (BMI) DOCUMENTED: ICD-10-PCS | Mod: ,,, | Performed by: STUDENT IN AN ORGANIZED HEALTH CARE EDUCATION/TRAINING PROGRAM

## 2023-09-06 PROCEDURE — 1159F MED LIST DOCD IN RCRD: CPT | Mod: ,,, | Performed by: STUDENT IN AN ORGANIZED HEALTH CARE EDUCATION/TRAINING PROGRAM

## 2023-09-06 PROCEDURE — 87491 CHLAMYDIA/GONORRHOEAE(GC), PCR: ICD-10-PCS | Mod: ,,, | Performed by: CLINICAL MEDICAL LABORATORY

## 2023-09-06 PROCEDURE — 99213 PR OFFICE/OUTPT VISIT, EST, LEVL III, 20-29 MIN: ICD-10-PCS | Mod: ,,, | Performed by: STUDENT IN AN ORGANIZED HEALTH CARE EDUCATION/TRAINING PROGRAM

## 2023-09-06 PROCEDURE — 3074F PR MOST RECENT SYSTOLIC BLOOD PRESSURE < 130 MM HG: ICD-10-PCS | Mod: ,,, | Performed by: STUDENT IN AN ORGANIZED HEALTH CARE EDUCATION/TRAINING PROGRAM

## 2023-09-06 PROCEDURE — 87491 CHLMYD TRACH DNA AMP PROBE: CPT | Mod: ,,, | Performed by: CLINICAL MEDICAL LABORATORY

## 2023-09-06 NOTE — PATIENT INSTRUCTIONS
Even if you do not have any signs of illness, you may spread an STD.  Having an STD can increase your chances of catching HIV, the virus that causes AIDS.  If you have any type of sexual contact, use latex condoms each time to reduce the spread of infection. Use a condom with each partner every time, from the start of sex to the end.  Avoid contact with any sex partner known to have an infection or who has signs of an infection like genital sores or warts.  Avoid multiple sex partners. A long-term monogamous relationship with a partner who has tested negative and is known to have no infection is the safest partner.  If you are pregnant, get tested and get prompt treatment for an STD. This will help avoid passing it to your baby.  Avoid using drugs or too much alcohol. Either of these can lead to risky behavior like unprotected sex.  Talk to your partner about STDs before you have sex. Talk about having safe sex and if your relationship is monogamous.  Wash your hands and genitals with soap and water after sex.  See your doctor for regular exams and check-ups for STDs.  Talk to your doctor about available vaccines for prevention of certain STDs.  No sexual activity pending test results, treatment if needed, and resolution of symptoms

## 2023-09-06 NOTE — PROGRESS NOTES
[facility]    Chief Complaint      Chief Complaint   Patient presents with    Vaginal Discharge     Pt believes she has BV, vaginal discharge started about two weeks ago. Vaginal discharge is thinner & it's darker white.        History of Present Illness      Chloe Wallace is a 23 y.o. female with chronic conditions of constipation who presents today for vaginal discharge, wants to be checked for BV along with other STDs.  No confirmed exposure.  Lmp 08/24/2023 no vaginal itching or irritation     Vaginal Discharge  The patient's primary symptoms include vaginal discharge (thin watery discharge). Pertinent negatives include no dysuria or fever.       Past Medical History:  Past Medical History:   Diagnosis Date    Perirectal abscess     Pilonidal abscess        Past Surgical History:   has a past surgical history that includes [place] tooth extraction and Incision and drainage of abscess (Right, 6/15/2021).    Social History:  Social History     Tobacco Use    Smoking status: Never     Passive exposure: Never    Smokeless tobacco: Never   Substance Use Topics    Alcohol use: Yes    Drug use: Never       I personally reviewed all past medical, surgical, and social.     Review of Systems   Constitutional:  Negative for fever.   Respiratory:  Negative for shortness of breath.    Cardiovascular:  Negative for chest pain.   Genitourinary:  Positive for vaginal discharge (thin watery discharge). Negative for dyspareunia, dysuria, genital sores and vaginal pain.        Medications:  Outpatient Encounter Medications as of 9/6/2023   Medication Sig Dispense Refill    polyethylene glycol (GLYCOLAX) 17 gram/dose powder Take 17 g by mouth once daily. (Patient not taking: Reported on 9/6/2023) 289 g 2     No facility-administered encounter medications on file as of 9/6/2023.       Allergies:  Review of patient's allergies indicates:  No Known Allergies    Health Maintenance:    There is no immunization history on file  "for this patient.   Health Maintenance   Topic Date Due    Hepatitis C Screening  Never done    HPV Vaccines (1 - 2-dose series) Never done    TETANUS VACCINE  Never done    Pap Smear  07/08/2023    Chlamydia Screening  05/17/2024    Lipid Panel  Completed        Physical Exam      Vital Signs  Temp: 98.2 °F (36.8 °C)  Temp Source: Oral  Pulse: 61  SpO2: 99 %  BP: 109/71  BP Location: Left arm  Patient Position: Sitting  Height and Weight  Height: 5' 4" (162.6 cm)  Weight: 73.6 kg (162 lb 4.8 oz)  BSA (Calculated - sq m): 1.82 sq meters  BMI (Calculated): 27.8  Weight in (lb) to have BMI = 25: 145.3]    Physical Exam  Constitutional:       Appearance: Normal appearance.   Cardiovascular:      Rate and Rhythm: Normal rate and regular rhythm.   Pulmonary:      Effort: Pulmonary effort is normal.      Breath sounds: Normal breath sounds.   Abdominal:      General: Bowel sounds are normal.      Palpations: Abdomen is soft.   Skin:     General: Skin is warm and dry.   Neurological:      Mental Status: She is alert.          Laboratory:  CBC:  Recent Labs   Lab 04/07/22  0853   WBC 5.46   RBC 4.41   Hemoglobin 13.1   Hematocrit 40.0   Platelet Count 291   MCV 90.7   MCH 29.7   MCHC 32.8     CMP:  Recent Labs   Lab 04/07/22  0853   Glucose 91   Calcium 9.7   Sodium 136   Potassium 4.3   CO2 27   Chloride 107   BUN 13     LIPIDS:  Recent Labs   Lab 01/26/22  0925 04/07/22  0853   TSH  --  1.090   HDL Cholesterol 91 H  --    Cholesterol 179  --    Triglycerides 34 L  --    LDL Calculated 81  --    Cholesterol/HDL Ratio (Risk Factor) 2.0  --    Non-HDL 88  --      TSH:  Recent Labs   Lab 04/07/22  0853   TSH 1.090     A1C:        Assessment/Plan     Chloe Wallace is a 23 y.o.female with:    1. Vaginal discharge  -     Bacterial Vaginosis; Future; Expected date: 09/06/2023  -     Chlamydia/GC, PCR; Future; Expected date: 09/06/2023         Chronic conditions status updated as per HPI.  Other than changes above, cont current " medications and maintain follow up with specialists.  Return to clinic as needed.     Patient Instructions   Even if you do not have any signs of illness, you may spread an STD.  Having an STD can increase your chances of catching HIV, the virus that causes AIDS.  If you have any type of sexual contact, use latex condoms each time to reduce the spread of infection. Use a condom with each partner every time, from the start of sex to the end.  Avoid contact with any sex partner known to have an infection or who has signs of an infection like genital sores or warts.  Avoid multiple sex partners. A long-term monogamous relationship with a partner who has tested negative and is known to have no infection is the safest partner.  If you are pregnant, get tested and get prompt treatment for an STD. This will help avoid passing it to your baby.  Avoid using drugs or too much alcohol. Either of these can lead to risky behavior like unprotected sex.  Talk to your partner about STDs before you have sex. Talk about having safe sex and if your relationship is monogamous.  Wash your hands and genitals with soap and water after sex.  See your doctor for regular exams and check-ups for STDs.  Talk to your doctor about available vaccines for prevention of certain STDs.  No sexual activity pending test results, treatment if needed, and resolution of symptoms      Rocío Lund, [facility]

## 2023-09-07 ENCOUNTER — TELEPHONE (OUTPATIENT)
Dept: FAMILY MEDICINE | Facility: CLINIC | Age: 23
End: 2023-09-07
Payer: COMMERCIAL

## 2023-09-07 LAB
CHLAMYDIA BY PCR: NEGATIVE
N. GONORRHOEAE (GC) BY PCR: NEGATIVE

## 2023-09-07 NOTE — TELEPHONE ENCOUNTER
Called pt to let her know that it was an issues processing her swab yesterday & we needed her to stop to do another. Pt stated that was ok. I explained that we're be her until 7 & this would be the only way for her to get treated properly. Pt stated ok, never really said she was coming.

## 2024-01-03 ENCOUNTER — OFFICE VISIT (OUTPATIENT)
Dept: FAMILY MEDICINE | Facility: CLINIC | Age: 24
End: 2024-01-03
Payer: COMMERCIAL

## 2024-01-03 VITALS
HEART RATE: 75 BPM | TEMPERATURE: 98 F | RESPIRATION RATE: 19 BRPM | SYSTOLIC BLOOD PRESSURE: 107 MMHG | OXYGEN SATURATION: 100 % | DIASTOLIC BLOOD PRESSURE: 71 MMHG | BODY MASS INDEX: 27.66 KG/M2 | HEIGHT: 64 IN | WEIGHT: 162 LBS

## 2024-01-03 DIAGNOSIS — H10.12 ALLERGIC CONJUNCTIVITIS OF LEFT EYE: Primary | ICD-10-CM

## 2024-01-03 PROCEDURE — 3078F DIAST BP <80 MM HG: CPT | Mod: ,,, | Performed by: NURSE PRACTITIONER

## 2024-01-03 PROCEDURE — 3074F SYST BP LT 130 MM HG: CPT | Mod: ,,, | Performed by: NURSE PRACTITIONER

## 2024-01-03 PROCEDURE — 1159F MED LIST DOCD IN RCRD: CPT | Mod: ,,, | Performed by: NURSE PRACTITIONER

## 2024-01-03 PROCEDURE — 99213 OFFICE O/P EST LOW 20 MIN: CPT | Mod: ,,, | Performed by: NURSE PRACTITIONER

## 2024-01-03 PROCEDURE — 3008F BODY MASS INDEX DOCD: CPT | Mod: ,,, | Performed by: NURSE PRACTITIONER

## 2024-01-03 RX ORDER — CETIRIZINE HYDROCHLORIDE 10 MG/1
10 TABLET ORAL NIGHTLY
Qty: 90 TABLET | Refills: 0 | Status: SHIPPED | OUTPATIENT
Start: 2024-01-03 | End: 2025-01-02

## 2024-01-03 RX ORDER — FLUTICASONE PROPIONATE 50 MCG
2 SPRAY, SUSPENSION (ML) NASAL DAILY
Qty: 18.2 ML | Refills: 0 | Status: SHIPPED | OUTPATIENT
Start: 2024-01-03

## 2024-01-03 NOTE — PROGRESS NOTES
"Subjective:       Patient ID: Chloe Wallace is a 23 y.o. female.    Vitals:  height is 5' 4" (1.626 m) and weight is 73.5 kg (162 lb). Her temperature is 98.4 °F (36.9 °C). Her blood pressure is 107/71 and her pulse is 75. Her respiration is 19 and oxygen saturation is 100%.     Chief Complaint: Eye Problem (Left eye watery x 1 month/Taking OTC allergy relief)    Eye Problem   The left eye is affected. This is a recurrent problem. The current episode started 1 to 4 weeks ago. The problem occurs constantly. The problem has been waxing and waning. There was no injury mechanism. The pain is at a severity of 0/10. The patient is experiencing no pain. There is No known exposure to pink eye. She Does not wear contacts. Associated symptoms include blurred vision and an eye discharge (watery). Pertinent negatives include no double vision, eye redness, fever, foreign body sensation, itching, nausea, photophobia, recent URI or vomiting. She has tried eye drops (OTC Claritin) for the symptoms. The treatment provided no relief.       Constitution: Negative for fatigue and fever.   HENT:  Negative for congestion.    Eyes:  Positive for eye discharge (watery) and blurred vision. Negative for eye itching, eye pain, eye redness, photophobia and double vision.   Gastrointestinal:  Negative for nausea and vomiting.   Allergic/Immunologic: Positive for environmental allergies and seasonal allergies. Negative for recurrent sinus infections.         Objective:      Physical Exam  Vitals reviewed.   Constitutional:       Appearance: Normal appearance.   HENT:      Nose:      Right Turbinates: Swollen.      Left Turbinates: Swollen.      Mouth/Throat:      Mouth: Mucous membranes are moist.   Eyes:      General: Lids are normal.         Left eye: No discharge or hordeolum.      Extraocular Movements:      Left eye: Normal extraocular motion and no nystagmus.      Conjunctiva/sclera: Conjunctivae normal.      Left eye: Left conjunctiva " is not injected. No exudate or hemorrhage.  Cardiovascular:      Rate and Rhythm: Normal rate and regular rhythm.      Heart sounds: No murmur heard.  Pulmonary:      Effort: Pulmonary effort is normal.      Breath sounds: Normal breath sounds.   Neurological:      Mental Status: She is alert and oriented to person, place, and time.   Psychiatric:         Mood and Affect: Mood normal.         Behavior: Behavior normal.         Thought Content: Thought content normal.         Judgment: Judgment normal.              Assessment:       1. Allergic conjunctivitis of left eye          No results found for this or any previous visit (from the past 24 hour(s)).     Plan:       -Recommended removal of artifical eyelashes.  -Encouraged scheduling eye exam ASAP.     Allergic conjunctivitis of left eye  -     cetirizine (ZYRTEC) 10 MG tablet; Take 1 tablet (10 mg total) by mouth every evening.  Dispense: 90 tablet; Refill: 0  -     fluticasone propionate (FLONASE) 50 mcg/actuation nasal spray; 2 sprays (100 mcg total) by Each Nostril route once daily.  Dispense: 18.2 mL; Refill: 0        Follow up if symptoms worsen or fail to improve.     No future appointments.     An After Visit Summary was printed and given to the patient.     Signature:    GLENNA Sharp  Family Nurse Practitioner  [facility]    Date of encounter: 1/3/24

## 2024-08-26 ENCOUNTER — OFFICE VISIT (OUTPATIENT)
Dept: OBSTETRICS AND GYNECOLOGY | Facility: CLINIC | Age: 24
End: 2024-08-26
Payer: COMMERCIAL

## 2024-08-26 VITALS
OXYGEN SATURATION: 99 % | WEIGHT: 165 LBS | RESPIRATION RATE: 18 BRPM | TEMPERATURE: 99 F | BODY MASS INDEX: 28.17 KG/M2 | SYSTOLIC BLOOD PRESSURE: 139 MMHG | HEIGHT: 64 IN | DIASTOLIC BLOOD PRESSURE: 82 MMHG | HEART RATE: 55 BPM

## 2024-08-26 DIAGNOSIS — Z01.419 ENCOUNTER FOR WELL WOMAN EXAM WITH ROUTINE GYNECOLOGICAL EXAM: Primary | ICD-10-CM

## 2024-08-26 DIAGNOSIS — Z72.51 HIGH RISK SEXUAL BEHAVIOR, UNSPECIFIED TYPE: ICD-10-CM

## 2024-08-26 LAB
CANDIDA SPECIES: NEGATIVE
GARDNERELLA: POSITIVE
HAV IGM SER QL: NORMAL
HBV CORE IGM SER QL: NORMAL
HBV SURFACE AG SERPL QL IA: NORMAL
HCV AB SER QL: NORMAL
HIV 1+O+2 AB SERPL QL: NORMAL
SYPHILIS AB INTERPRETATION: NORMAL
TRICHOMONAS: NEGATIVE

## 2024-08-26 PROCEDURE — 87480 CANDIDA DNA DIR PROBE: CPT | Mod: ,,, | Performed by: CLINICAL MEDICAL LABORATORY

## 2024-08-26 PROCEDURE — 1159F MED LIST DOCD IN RCRD: CPT | Mod: ,,, | Performed by: ADVANCED PRACTICE MIDWIFE

## 2024-08-26 PROCEDURE — 99385 PREV VISIT NEW AGE 18-39: CPT | Mod: ,,, | Performed by: ADVANCED PRACTICE MIDWIFE

## 2024-08-26 PROCEDURE — 3079F DIAST BP 80-89 MM HG: CPT | Mod: ,,, | Performed by: ADVANCED PRACTICE MIDWIFE

## 2024-08-26 PROCEDURE — 88142 CYTOPATH C/V THIN LAYER: CPT | Mod: TC,GCY | Performed by: ADVANCED PRACTICE MIDWIFE

## 2024-08-26 PROCEDURE — 80074 ACUTE HEPATITIS PANEL: CPT | Mod: ,,, | Performed by: CLINICAL MEDICAL LABORATORY

## 2024-08-26 PROCEDURE — 87591 N.GONORRHOEAE DNA AMP PROB: CPT | Mod: ,,, | Performed by: CLINICAL MEDICAL LABORATORY

## 2024-08-26 PROCEDURE — 87491 CHLMYD TRACH DNA AMP PROBE: CPT | Mod: ,,, | Performed by: CLINICAL MEDICAL LABORATORY

## 2024-08-26 PROCEDURE — 87389 HIV-1 AG W/HIV-1&-2 AB AG IA: CPT | Mod: ,,, | Performed by: CLINICAL MEDICAL LABORATORY

## 2024-08-26 PROCEDURE — 86780 TREPONEMA PALLIDUM: CPT | Mod: ,,, | Performed by: CLINICAL MEDICAL LABORATORY

## 2024-08-26 PROCEDURE — 99459 PELVIC EXAMINATION: CPT | Mod: ,,, | Performed by: ADVANCED PRACTICE MIDWIFE

## 2024-08-26 PROCEDURE — 87510 GARDNER VAG DNA DIR PROBE: CPT | Mod: ,,, | Performed by: CLINICAL MEDICAL LABORATORY

## 2024-08-26 PROCEDURE — 3008F BODY MASS INDEX DOCD: CPT | Mod: ,,, | Performed by: ADVANCED PRACTICE MIDWIFE

## 2024-08-26 PROCEDURE — 87660 TRICHOMONAS VAGIN DIR PROBE: CPT | Mod: ,,, | Performed by: CLINICAL MEDICAL LABORATORY

## 2024-08-26 PROCEDURE — 3075F SYST BP GE 130 - 139MM HG: CPT | Mod: ,,, | Performed by: ADVANCED PRACTICE MIDWIFE

## 2024-08-26 PROCEDURE — 36415 COLL VENOUS BLD VENIPUNCTURE: CPT | Mod: ,,, | Performed by: ADVANCED PRACTICE MIDWIFE

## 2024-08-26 NOTE — PROGRESS NOTES
"CC: Here for pap smear, annual exam    Chloe Wallace is a 24 y.o. female  presents for well woman exam.  LMP: Patient's last menstrual period was 2024 (exact date).. Menses are: monthly, medium flow, lasts 5 days, with small blood clots, and averages 3 pads/day with menstrual cycles . States was sexually active and partner did not use a condom. She does not desire birth control. Denies any further issues, problems, or complaints.    Last mammogram: n/a  Colonoscopy: n/a    Past Medical History:   Diagnosis Date    Perirectal abscess     Pilonidal abscess      Past Surgical History:   Procedure Laterality Date    INCISION AND DRAINAGE OF ABSCESS Right 6/15/2021    Procedure: INCISION AND DRAINAGE, ABSCESS;  Surgeon: Gabino Allred MD;  Location: [facility];  Service: General;  Laterality: Right;  Buttocks and right upper thigh    [place]IS[place] TOOTH EXTRACTION       Social History     Socioeconomic History    Marital status: Single   Tobacco Use    Smoking status: Never     Passive exposure: Never    Smokeless tobacco: Never   Substance and Sexual Activity    Alcohol use: Yes    Drug use: Never    Sexual activity: Yes     Partners: Male     Birth control/protection: Condom     Family History   Problem Relation Name Age of Onset    Hypertension Father      Colon cancer Father      Ovarian cancer Other paternal Great aunt     Colon cancer Other paternal Great aunt     Colon cancer Other Paternal Great GM      OB History          0    Para   0    Term   0       0    AB   0    Living   0         SAB   0    IAB   0    Ectopic   0    Multiple   0    Live Births   0                 /82   Pulse (!) 55   Temp 98.6 °F (37 °C) (Oral)   Resp 18   Ht 5' 4" (1.626 m)   Wt 74.8 kg (165 lb)   LMP 2024 (Exact Date)   SpO2 99%   BMI 28.32 kg/m²       ROS:  GENERAL: Denies weight gain or weight loss. Feeling well overall.   SKIN: Denies rash or lesions.   HEAD: Denies head injury or headache. "   NODES: Denies enlarged lymph nodes.   CHEST: Denies chest pain or shortness of breath.   CARDIOVASCULAR: Denies palpitations or left sided chest pain.   ABDOMEN: No abdominal pain, constipation, diarrhea, nausea, vomiting or rectal bleeding.   URINARY: No frequency, dysuria, hematuria, or burning on urination.  REPRODUCTIVE: See HPI.   BREASTS: The patient performs breast self-examination and denies pain, lumps, or nipple discharge.   HEMATOLOGIC: No easy bruisability or excessive bleeding.   MUSCULOSKELETAL: Denies joint pain or swelling.   NEUROLOGIC: Denies syncope or weakness.   PSYCHIATRIC: Denies depression, anxiety or mood swings.    PHYSICAL EXAM:  APPEARANCE: Well nourished, well developed, in no acute distress.  AFFECT: WNL, alert and oriented x 3  SKIN: No acne or hirsutism  NECK: Neck symmetric without masses or thyromegaly  NODES: No inguinal, cervical, axillary, or femoral lymph node enlargement  CHEST: Good respiratory effect  ABDOMEN: Soft.  No tenderness or masses.  No hepatosplenomegaly.  No hernias.  BREASTS: Symmetrical, no skin changes or visible lesions.  No palpable masses, nipple discharge bilaterally.  PELVIC: Normal external genitalia without lesions.  Normal hair distribution.  Adequate perineal body, normal urethral meatus.  Vagina moist and well rugated without lesions, with thin white discharge.  Cervix pink, without lesions, tenderness with thin white discharge.  No significant cystocele or rectocele.  Bimanual exam shows uterus to be normal size, regular, mobile and nontender.  Adnexa without masses or tenderness.  Chaperone present for exam  EXTREMITIES: No edema.    Encounter for well woman exam with routine gynecological exam  -     ThinPrep Pap Test; Future; Expected date: 08/26/2024    High risk sexual behavior, unspecified type  -     HIV 1/2 Ag/Ab (4th Gen); Future; Expected date: 08/26/2024  -     Chlamydia/GC, PCR; Future; Expected date: 08/26/2024  -     Hepatitis Panel,  Acute; Future; Expected date: 08/26/2024  -     Bacterial Vaginosis  -     Syphilis Antibody with reflex to RPR; Future; Expected date: 08/26/2024        ICD-10-CM ICD-9-CM    1. Encounter for well woman exam with routine gynecological exam  Z01.419 V72.31 ThinPrep Pap Test      2. High risk sexual behavior, unspecified type  Z72.51 V69.2 HIV 1/2 Ag/Ab (4th Gen)      Chlamydia/GC, PCR      Hepatitis Panel, Acute      Bacterial Vaginosis      Syphilis Antibody with reflex to RPR          Patient was counseled today on A.C.S. Pap guidelines and recommendations for yearly pelvic exams, mammograms and monthly self breast exams; to see her PCP for other health maintenance.   Exercise regimen encouraged  Healthy food choices encouraged  Multivitamins daily  Vitamin D daily  States will obtain results from [facility]  Questions answered to desired level of satisfaction  Verbalized understanding to all information and instructions    Follow up in about 1 year (around 8/26/2025), or if symptoms worsen or fail to improve, for annual exam.

## 2024-08-27 LAB
CHLAMYDIA BY PCR: POSITIVE
N. GONORRHOEAE (GC) BY PCR: NEGATIVE

## 2024-08-28 LAB
GH SERPL-MCNC: NORMAL NG/ML
INSULIN SERPL-ACNC: NORMAL U[IU]/ML
LAB AP CLINICAL INFORMATION: NORMAL
LAB AP GYN INTERPRETATION: NEGATIVE
LAB AP PAP DISCLAIMER COMMENTS: NORMAL
RENIN PLAS-CCNC: NORMAL NG/ML/H

## 2024-09-04 ENCOUNTER — OFFICE VISIT (OUTPATIENT)
Dept: FAMILY MEDICINE | Facility: CLINIC | Age: 24
End: 2024-09-04
Payer: COMMERCIAL

## 2024-09-04 VITALS
BODY MASS INDEX: 28.68 KG/M2 | RESPIRATION RATE: 17 BRPM | OXYGEN SATURATION: 99 % | WEIGHT: 168 LBS | DIASTOLIC BLOOD PRESSURE: 74 MMHG | TEMPERATURE: 98 F | HEART RATE: 60 BPM | HEIGHT: 64 IN | SYSTOLIC BLOOD PRESSURE: 117 MMHG

## 2024-09-04 DIAGNOSIS — N76.0 BV (BACTERIAL VAGINOSIS): ICD-10-CM

## 2024-09-04 DIAGNOSIS — A74.9 CHLAMYDIA: Primary | ICD-10-CM

## 2024-09-04 DIAGNOSIS — B96.89 BV (BACTERIAL VAGINOSIS): ICD-10-CM

## 2024-09-04 PROCEDURE — 3078F DIAST BP <80 MM HG: CPT | Mod: ,,, | Performed by: NURSE PRACTITIONER

## 2024-09-04 PROCEDURE — 1159F MED LIST DOCD IN RCRD: CPT | Mod: ,,, | Performed by: NURSE PRACTITIONER

## 2024-09-04 PROCEDURE — 99213 OFFICE O/P EST LOW 20 MIN: CPT | Mod: ,,, | Performed by: NURSE PRACTITIONER

## 2024-09-04 PROCEDURE — 3008F BODY MASS INDEX DOCD: CPT | Mod: ,,, | Performed by: NURSE PRACTITIONER

## 2024-09-04 PROCEDURE — 3074F SYST BP LT 130 MM HG: CPT | Mod: ,,, | Performed by: NURSE PRACTITIONER

## 2024-09-04 RX ORDER — METRONIDAZOLE 500 MG/1
500 TABLET ORAL EVERY 12 HOURS
Qty: 14 TABLET | Refills: 0 | Status: SHIPPED | OUTPATIENT
Start: 2024-09-04 | End: 2024-09-11

## 2024-09-04 RX ORDER — AZITHROMYCIN 500 MG/1
1000 TABLET, FILM COATED ORAL ONCE
Qty: 2 TABLET | Refills: 0 | Status: SHIPPED | OUTPATIENT
Start: 2024-09-04 | End: 2024-09-05 | Stop reason: SDUPTHER

## 2024-09-04 NOTE — PROGRESS NOTES
Subjective:       Patient ID: Chloe Wallace is a 24 y.o. female.    Chief Complaint: Vaginal Discharge (Pt states two weeks she was seen two weeks ago for obgyn and tested positive for BV and chlamydia. She states she was never treated for it. )    Positive for BV and chlamydia and requesting treatment    Review of Systems   Constitutional:  Negative for chills, fatigue and fever.   Genitourinary:  Positive for vaginal discharge. Negative for dysuria, flank pain, frequency, genital sores and urgency.         Objective:      Physical Exam  Constitutional:       General: She is not in acute distress.     Appearance: Normal appearance. She is not ill-appearing, toxic-appearing or diaphoretic.   Eyes:      Pupils: Pupils are equal, round, and reactive to light.   Cardiovascular:      Rate and Rhythm: Normal rate and regular rhythm.      Pulses: Normal pulses.      Heart sounds: Normal heart sounds. No murmur heard.  Pulmonary:      Effort: Pulmonary effort is normal.      Breath sounds: Normal breath sounds. No wheezing, rhonchi or rales.   Abdominal:      General: Bowel sounds are normal.      Palpations: Abdomen is soft.      Tenderness: There is no abdominal tenderness. There is no right CVA tenderness, left CVA tenderness, guarding or rebound.   Genitourinary:     Vagina: Vaginal discharge present.   Musculoskeletal:      Cervical back: Neck supple.   Skin:     General: Skin is warm and dry.      Findings: No lesion or rash.   Neurological:      Mental Status: She is alert and oriented to person, place, and time.   Psychiatric:         Mood and Affect: Mood normal.         Behavior: Behavior normal.         Thought Content: Thought content normal.         Judgment: Judgment normal.         Office Visit on 08/26/2024   Component Date Value Ref Range Status    Candida Species 08/26/2024 Negative  Negative, Invalid Final    Gardnerella 08/26/2024 Positive (A)  Negative, Invalid Final    Trichomonas 08/26/2024  Negative  Negative, Invalid Final    HIV 1/2 08/26/2024 Non-Reactive  Non-Reactive Final    Hepatits A Ab, IgM 08/26/2024 Non-Reactive  Non-Reactive Final    Hepatitis B Surface Ag 08/26/2024 Non-Reactive  Non-Reactive Final    Hepatitis B Core Ab, IgM 08/26/2024 Non-Reactive  Non-Reactive Final    Hepatitis C Ab 08/26/2024 Non-Reactive  Non-Reactive Final    Syphilis Ab Interpretation 08/26/2024 Non-Reactive  Non-Reactive Final    0.0 - 0.9: Non-Reactive  0.91 - 1.10: Equivocal with RPR to follow  >1.10:  Reactive with RPR to Follow    Case Report 08/26/2024    Final                    Value:Pap Cytology                                      Case: [id number]                                   Authorizing Provider:  Rachel Cruz CNM       Collected:           08/26/2024 03:05 PM          Ordering Location:     [facility]   Received:            08/27/2024 09:56 AM                                 Clinic - OB/GYN                                                              First Screen:          [facility] CT(ASCP)                                                   Specimen:    Liquid-Based Pap Test, Screening, Endocervix                                               Interpretation 08/26/2024 Negative              Final    Predominance of coccobacilli consistent with shift in vaginal sonja      General Categorization 08/26/2024 Negative for intraepithelial lesion or malignancy   Final    Specimen Adequacy 08/26/2024 Satisfactory for evaluation   Final    Clinical Information 08/26/2024    Final                    Value:This result contains rich text formatting which cannot be displayed here.    Disclaimer 08/26/2024    Final                    Value:This result contains rich text formatting which cannot be displayed here.    Chlamydia by PCR 08/26/2024 Positive (A)  Negative, Invalid Final    N. gonorrhoeae (GC) by PCR 08/26/2024 Negative  Negative, Invalid Final      Assessment:       1. Chlamydia     2. BV (bacterial vaginosis)        Plan:   Chlamydia  -     azithromycin (ZITHROMAX) 500 MG tablet; Take 2 tablets (1,000 mg total) by mouth once. for 1 dose  Dispense: 2 tablet; Refill: 0    BV (bacterial vaginosis)  -     metroNIDAZOLE (FLAGYL) 500 MG tablet; Take 1 tablet (500 mg total) by mouth every 12 (twelve) hours. for 14 doses  Dispense: 14 tablet; Refill: 0           Risks, benefits, and side effects were discussed with the patient. All questions were answered to the fullest satisfaction of the patient, and pt verbalized understanding and agreement to treatment plan. Pt was to call with any new or worsening symptoms, or present to the ER

## 2024-09-05 DIAGNOSIS — A74.9 CHLAMYDIA: ICD-10-CM

## 2024-09-05 RX ORDER — AZITHROMYCIN 500 MG/1
1000 TABLET, FILM COATED ORAL ONCE
Qty: 2 TABLET | Refills: 0 | Status: SHIPPED | OUTPATIENT
Start: 2024-09-05 | End: 2024-09-07

## 2025-01-07 ENCOUNTER — OFFICE VISIT (OUTPATIENT)
Dept: FAMILY MEDICINE | Facility: CLINIC | Age: 25
End: 2025-01-07
Payer: COMMERCIAL

## 2025-01-07 VITALS
SYSTOLIC BLOOD PRESSURE: 102 MMHG | RESPIRATION RATE: 16 BRPM | BODY MASS INDEX: 28.85 KG/M2 | OXYGEN SATURATION: 98 % | WEIGHT: 169 LBS | HEART RATE: 68 BPM | HEIGHT: 64 IN | TEMPERATURE: 98 F | DIASTOLIC BLOOD PRESSURE: 64 MMHG

## 2025-01-07 DIAGNOSIS — Z02.0 SCHOOL PHYSICAL EXAM: Primary | ICD-10-CM

## 2025-01-07 PROCEDURE — 99499 UNLISTED E&M SERVICE: CPT | Mod: GE,,, | Performed by: FAMILY MEDICINE

## 2025-01-07 NOTE — ASSESSMENT & PLAN NOTE
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 She reports being up to date on her vaccines. She denies any medical problems. She denies taking any medication. Patient also denies family history of sudden death or cardiac issues.    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 School form was filled out and handed to the patient today.

## 2025-01-07 NOTE — PROGRESS NOTES
Subjective:       Patient ID: Chleo Wallace is a 24 y.o. female.    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 She reports being up to date on her vaccines. She denies any medical problems. She denies taking any medication. Patient also denies family history of sudden death or cardiac issues. She reports being up-to-date with pap smear. She currently do not have a PCP and will think about returning to our clinic for regular visit in a few months. She denies smoking. She reports drinking alcohol socially. Patient denies any drug use. Patient denies fever, chills, nausea, vomiting, SOB, chest, pain, bowel or urinary habit changes.      No current outpatient medications on file.    Review of patient's allergies indicates:  No Known Allergies    Past Medical History:   Diagnosis Date    Perirectal abscess     Pilonidal abscess        Past Surgical History:   Procedure Laterality Date    INCISION AND DRAINAGE OF ABSCESS Right 6/15/2021    Procedure: INCISION AND DRAINAGE, ABSCESS;  Surgeon: Gabino Allred MD;  Location: [facility];  Service: General;  Laterality: Right;  Buttocks and right upper thigh    WISDOM TOOTH EXTRACTION         Family History   Problem Relation Name Age of Onset    Hypertension Father      Colon cancer Father      Ovarian cancer Other paternal Great aunt     Colon cancer Other paternal Great aunt     Colon cancer Other Paternal Great GM        Social History     Tobacco Use    Smoking status: Never     Passive exposure: Never    Smokeless tobacco: Never   Substance Use Topics    Alcohol use: Yes    Drug use: Never       Review of Systems   Constitutional:  Negative for chills, fatigue and fever.   HENT:  Negative for nasal congestion and rhinorrhea.    Eyes:  Negative for visual disturbance.   Respiratory:   "Negative for cough and shortness of breath.    Cardiovascular:  Negative for chest pain, palpitations and leg swelling.   Gastrointestinal:  Negative for abdominal pain, blood in stool, change in bowel habit, diarrhea, nausea and vomiting.   Genitourinary:  Negative for difficulty urinating and hematuria.   Musculoskeletal:  Negative for myalgias.   Integumentary:  Negative for rash and wound.   Neurological:  Negative for light-headedness and headaches.   Psychiatric/Behavioral:  Negative for sleep disturbance. The patient is not nervous/anxious.          Current Medications:            Objective:        Vitals:    01/07/25 1400   BP: 102/64   BP Location: Left arm   Patient Position: Sitting   Pulse: 68   Resp: 16   Temp: 98 °F (36.7 °C)   TempSrc: Oral   SpO2: 98%   Weight: 76.7 kg (169 lb)   Height: 5' 4" (1.626 m)       Physical Exam  Constitutional:       General: She is not in acute distress.     Appearance: Normal appearance. She is not toxic-appearing.   HENT:      Head: Normocephalic and atraumatic.      Right Ear: External ear normal.      Left Ear: External ear normal.      Nose: Nose normal.      Mouth/Throat:      Pharynx: Oropharynx is clear.   Cardiovascular:      Rate and Rhythm: Normal rate and regular rhythm.      Pulses: Normal pulses.      Heart sounds: Normal heart sounds.   Pulmonary:      Effort: Pulmonary effort is normal. No respiratory distress.      Breath sounds: Normal breath sounds. No wheezing, rhonchi or rales.   Abdominal:      General: Bowel sounds are normal. There is no distension.      Tenderness: There is no abdominal tenderness. There is no guarding or rebound.   Musculoskeletal:         General: Normal range of motion.      Cervical back: Normal range of motion and neck supple.   Skin:     General: Skin is warm and dry.      Findings: No rash.   Neurological:      General: No focal deficit present.      Mental Status: She is alert and oriented to person, place, and time. "   Psychiatric:         Mood and Affect: Mood normal.         Behavior: Behavior normal.             Lab Results   Component Value Date    WBC 5.46 04/07/2022    HGB 13.1 04/07/2022    HCT 40.0 04/07/2022     04/07/2022    CHOL 179 01/26/2022    TRIG 34 (L) 01/26/2022    HDL 91 (H) 01/26/2022     04/07/2022    K 4.3 04/07/2022     04/07/2022    CREATININE 0.97 04/07/2022    BUN 13 04/07/2022    CO2 27 04/07/2022    TSH 1.090 04/07/2022      Assessment:       1. 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 She reports being up to date on her vaccines. She denies any medical problems. She denies taking any medication. Patient also denies family history of sudden death or cardiac issues.    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 School form was filled out and handed to the patient today.               No follow-ups on file.    Jamar Hill MD     Instructed patient that if symptoms fail to improve or worsen patient should seek immediate medical attention or report to the nearest emergency department. Patient expressed verbal agreement and understanding to this plan of care.

## 2025-01-29 ENCOUNTER — OFFICE VISIT (OUTPATIENT)
Dept: FAMILY MEDICINE | Facility: CLINIC | Age: 25
End: 2025-01-29
Payer: COMMERCIAL

## 2025-01-29 VITALS
SYSTOLIC BLOOD PRESSURE: 116 MMHG | HEIGHT: 64 IN | OXYGEN SATURATION: 100 % | WEIGHT: 171.69 LBS | HEART RATE: 69 BPM | DIASTOLIC BLOOD PRESSURE: 61 MMHG | TEMPERATURE: 99 F | RESPIRATION RATE: 16 BRPM | BODY MASS INDEX: 29.31 KG/M2

## 2025-01-29 DIAGNOSIS — N89.8 VAGINAL DISCHARGE: Primary | ICD-10-CM

## 2025-01-29 PROBLEM — R11.10 VOMITING: Status: RESOLVED | Noted: 2023-06-07 | Resolved: 2025-01-29

## 2025-01-29 PROBLEM — K59.00 CONSTIPATION: Status: RESOLVED | Noted: 2022-04-07 | Resolved: 2025-01-29

## 2025-01-29 PROBLEM — A74.9 CHLAMYDIA: Status: RESOLVED | Noted: 2024-09-04 | Resolved: 2025-01-29

## 2025-01-29 PROBLEM — K92.1 HEMATOCHEZIA: Status: RESOLVED | Noted: 2022-04-07 | Resolved: 2025-01-29

## 2025-01-29 LAB
BACTERIAL VAGINOSIS DNA (OHS): NEGATIVE
BILIRUB SERPL-MCNC: NORMAL MG/DL
BLOOD URINE, POC: NORMAL
CANDIDA GLABRATA/KRUSEI DNA (OHS): NOT DETECTED
CANDIDA SPECIES DNA (OHS): NOT DETECTED
CLARITY, UA: CLEAR
COLOR, UA: YELLOW
GLUCOSE UR QL STRIP: NORMAL
KETONES UR QL STRIP: NORMAL
LEUKOCYTE ESTERASE URINE, POC: NORMAL
NITRITE, POC UA: NORMAL
PH, POC UA: 6
PROTEIN, POC: NORMAL
SPECIFIC GRAVITY, POC UA: 1.03
TRICHOMONAS VAGINALIS DNA (OHS): NOT DETECTED
UROBILINOGEN, POC UA: 0.2

## 2025-01-29 PROCEDURE — 81515 NFCT DS BV&VAGINITIS DNA ALG: CPT | Mod: ,,, | Performed by: CLINICAL MEDICAL LABORATORY

## 2025-01-29 PROCEDURE — 87661 TRICHOMONAS VAGINALIS AMPLIF: CPT | Mod: ,,, | Performed by: CLINICAL MEDICAL LABORATORY

## 2025-01-29 PROCEDURE — 87591 N.GONORRHOEAE DNA AMP PROB: CPT | Mod: ,,, | Performed by: CLINICAL MEDICAL LABORATORY

## 2025-01-29 PROCEDURE — 1160F RVW MEDS BY RX/DR IN RCRD: CPT | Mod: CPTII,,, | Performed by: NURSE PRACTITIONER

## 2025-01-29 PROCEDURE — 1159F MED LIST DOCD IN RCRD: CPT | Mod: CPTII,,, | Performed by: NURSE PRACTITIONER

## 2025-01-29 PROCEDURE — 3078F DIAST BP <80 MM HG: CPT | Mod: CPTII,,, | Performed by: NURSE PRACTITIONER

## 2025-01-29 PROCEDURE — 3008F BODY MASS INDEX DOCD: CPT | Mod: CPTII,,, | Performed by: NURSE PRACTITIONER

## 2025-01-29 PROCEDURE — 3074F SYST BP LT 130 MM HG: CPT | Mod: CPTII,,, | Performed by: NURSE PRACTITIONER

## 2025-01-29 PROCEDURE — 99214 OFFICE O/P EST MOD 30 MIN: CPT | Mod: ,,, | Performed by: NURSE PRACTITIONER

## 2025-01-29 PROCEDURE — 87491 CHLMYD TRACH DNA AMP PROBE: CPT | Mod: ,,, | Performed by: CLINICAL MEDICAL LABORATORY

## 2025-01-29 RX ORDER — DOXYCYCLINE 100 MG/1
100 CAPSULE ORAL 2 TIMES DAILY
Qty: 14 CAPSULE | Refills: 0 | Status: SHIPPED | OUTPATIENT
Start: 2025-01-29 | End: 2025-02-05

## 2025-01-29 RX ORDER — METRONIDAZOLE 500 MG/1
500 TABLET ORAL EVERY 12 HOURS
Qty: 14 TABLET | Refills: 0 | Status: SHIPPED | OUTPATIENT
Start: 2025-01-29 | End: 2025-02-05

## 2025-01-29 RX ORDER — FLUCONAZOLE 100 MG/1
100 TABLET ORAL DAILY
Qty: 5 TABLET | Refills: 0 | Status: SHIPPED | OUTPATIENT
Start: 2025-01-29 | End: 2025-02-03

## 2025-01-29 NOTE — PROGRESS NOTES
"   BREA Welch   [facility] [address] [place] [address]  [phone]      PATIENT NAME: Chloe Wallace  : 2000  DATE: 25  MRN: [id number]      Billing Provider: BREA Welch  Level of Service:   Patient PCP Information       Provider PCP Type    Primary Doctor No General            Reason for Visit / Chief Complaint: Vaginal Discharge       Update PCP  Update Chief Complaint         History of Present Illness / Problem Focused Workflow     Chloe Wallace presents to the clinic with Vaginal Discharge     Pt presents for STD testing.  She reports  vaginal discharge and itching for 2  days.     Discussed importance of "safe sex" and importance of protected yourself from risk.   Avoid ETOH while on antibiotics  Abstain from sexual activity for 3weeks while on antibiotics  RTC if s/s do not resolve or return         Review of Systems     Review of Systems   Genitourinary:  Positive for vaginal discharge.        Medical / Social / Family History     Past Medical History:   Diagnosis Date    Perirectal abscess     Pilonidal abscess        Past Surgical History:   Procedure Laterality Date    INCISION AND DRAINAGE OF ABSCESS Right 6/15/2021    Procedure: INCISION AND DRAINAGE, ABSCESS;  Surgeon: Gabino Allred MD;  Location: [facility];  Service: General;  Laterality: Right;  Buttocks and right upper thigh    WISDOM TOOTH EXTRACTION         Social History  Ms. Wallace  reports that she has never smoked. She has never been exposed to tobacco smoke. She has never used smokeless tobacco. She reports current alcohol use. She reports that she does not use drugs.    Family History  Ms. Wallace's family history includes Colon cancer in her father and other family members; Hypertension in her father; Ovarian cancer in an other family member.    Medications and Allergies     Medications  No outpatient " "medications have been marked as taking for the 1/29/25 encounter (Office Visit) with Celia Harris ACNP.       Allergies  Review of patient's allergies indicates:  No Known Allergies    Physical Examination     Vitals:    01/29/25 1110   BP: 116/61   Pulse: 69   Resp: 16   Temp: 98.5 °F (36.9 °C)     Physical Exam  Eyes:      Pupils: Pupils are equal, round, and reactive to light.   Cardiovascular:      Rate and Rhythm: Normal rate and regular rhythm.      Heart sounds: Normal heart sounds. No murmur heard.  Pulmonary:      Breath sounds: Normal breath sounds. No wheezing, rhonchi or rales.   Abdominal:      General: Bowel sounds are normal.      Palpations: Abdomen is soft.   Musculoskeletal:         General: No swelling.      Cervical back: Normal range of motion and neck supple.   Skin:     General: Skin is warm and dry.   Neurological:      Mental Status: She is alert and oriented to person, place, and time.          Lab Results   Component Value Date    WBC 5.46 04/07/2022    HGB 13.1 04/07/2022    HCT 40.0 04/07/2022    MCV 90.7 04/07/2022     04/07/2022        Sodium   Date Value Ref Range Status   04/07/2022 136 136 - 145 mmol/L Final     Potassium   Date Value Ref Range Status   04/07/2022 4.3 3.5 - 5.1 mmol/L Final     Chloride   Date Value Ref Range Status   04/07/2022 107 98 - 107 mmol/L Final     CO2   Date Value Ref Range Status   04/07/2022 27 21 - 32 mmol/L Final     Glucose   Date Value Ref Range Status   04/07/2022 91 74 - 106 mg/dL Final     BUN   Date Value Ref Range Status   04/07/2022 13 7 - 18 mg/dL Final     Creatinine   Date Value Ref Range Status   04/07/2022 0.97 0.55 - 1.02 mg/dL Final     Calcium   Date Value Ref Range Status   04/07/2022 9.7 8.5 - 10.1 mg/dL Final     Anion Gap   Date Value Ref Range Status   04/07/2022 6 (L) 7 - 16 mmol/L Final      No results found for: "LABA1C", "HGBA1C"   Lab Results   Component Value Date    CHOL 179 01/26/2022     Lab Results " "  Component Value Date    HDL 91 (H) 01/26/2022     Lab Results   Component Value Date    LDLCALC 81 01/26/2022     No results found for: "DLDL"  Lab Results   Component Value Date    TRIG 34 (L) 01/26/2022     Lab Results   Component Value Date    CHOLHDL 2.0 01/26/2022      Lab Results   Component Value Date    TSH 1.090 04/07/2022    FREET4 0.80 04/07/2022        Assessment and Plan (including Health Maintenance)      Problem List  Smart Sets  Document Outside HM   :    Plan:     1. Vaginal discharge  -     Vaginosis Screen by DNA Probe; Future; Expected date: 01/29/2025  -     Trichomonas vaginalis by PCR  -     POCT URINALYSIS W/O SCOPE  -     Chlamydia/GC, PCR  -     doxycycline (MONODOX) 100 MG capsule; Take 1 capsule by mouth 2 (two) times daily for 7 days  Dispense: 14 capsule; Refill: 0  -     metroNIDAZOLE (FLAGYL) 500 MG tablet; Take 1 tablet by mouth every 12 hours for 7 days... No alcohol  Dispense: 14 tablet; Refill: 0  -     fluconazole (DIFLUCAN) 100 MG tablet; Take 1 tablet by mouth once daily for 5 days  Dispense: 5 tablet; Refill: 0         There are no Patient Instructions on file for this visit.     Health Maintenance Due   Topic Date Due    HPV Vaccines (1 - 3-dose series) Never done    Influenza Vaccine (1) Never done    COVID-19 Vaccine (1 - 2024-25 season) Never done         Health Maintenance Topics with due status: Not Due       Topic Last Completion Date    Chlamydia Screening 08/26/2024    Pap Smear 08/26/2024    TETANUS VACCINE 01/24/2025    RSV Vaccine (Age 60+ and Pregnant patients) Not Due       Future Appointments   Date Time Provider Department Center   9/4/2025 11:00 AM Rachel Cruz, [facility]            Signature:  [facility] [address] [place] [address]  [phone]    Date of encounter: 1/29/25  "

## 2025-01-30 LAB
CHLAMYDIA BY PCR: NEGATIVE
N. GONORRHOEAE (GC) BY PCR: NEGATIVE
TRICHOMONAS NAT: NEGATIVE

## 2025-02-20 ENCOUNTER — OFFICE VISIT (OUTPATIENT)
Dept: FAMILY MEDICINE | Facility: CLINIC | Age: 25
End: 2025-02-20
Payer: COMMERCIAL

## 2025-02-20 VITALS
OXYGEN SATURATION: 97 % | RESPIRATION RATE: 18 BRPM | BODY MASS INDEX: 29.74 KG/M2 | SYSTOLIC BLOOD PRESSURE: 117 MMHG | HEART RATE: 83 BPM | WEIGHT: 174.19 LBS | HEIGHT: 64 IN | DIASTOLIC BLOOD PRESSURE: 78 MMHG

## 2025-02-20 DIAGNOSIS — Z53.8 APPOINTMENT CANCELED BY HOSPITAL: Primary | ICD-10-CM

## 2025-02-20 PROCEDURE — 99499 UNLISTED E&M SERVICE: CPT | Mod: ,,, | Performed by: FAMILY MEDICINE

## 2025-02-20 NOTE — PROGRESS NOTES
Subjective:       Patient ID: Chloe Wallace is a 24 y.o. female.    Chief Complaint: No chief complaint on file.    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 She reports being up to date on her vaccines. She denies any medical problems. She denies taking any medication. Patient also denies family history of sudden death or cardiac issues. She reports being up-to-date with pap smear. She currently do not have a PCP and will think about returning to our clinic for regular visit in a few months. She denies smoking. She reports drinking alcohol socially. Patient denies any drug use. Patient denies fever, chills, nausea, vomiting, SOB, chest, pain, bowel or urinary habit changes.      Current Medications[1]    Review of patient's allergies indicates:  No Known Allergies    Past Medical History:   Diagnosis Date    Perirectal abscess     Pilonidal abscess        Past Surgical History:   Procedure Laterality Date    INCISION AND DRAINAGE OF ABSCESS Right 6/15/2021    Procedure: INCISION AND DRAINAGE, ABSCESS;  Surgeon: Gabino Allred MD;  Location: [facility];  Service: General;  Laterality: Right;  Buttocks and right upper thigh    WISDOM TOOTH EXTRACTION         Family History   Problem Relation Name Age of Onset    Hypertension Father      Colon cancer Father      Ovarian cancer Other paternal Great aunt     Colon cancer Other paternal Great aunt     Colon cancer Other Paternal Great GM        Social History[2]    Review of Systems   Constitutional:  Negative for chills and fatigue.   HENT:  Negative for nasal congestion and rhinorrhea.    Eyes:  Negative for visual disturbance.   Respiratory:  Negative for cough and shortness of breath.    Cardiovascular:  Negative for chest pain, palpitations and leg swelling.   Gastrointestinal:  Negative for abdominal pain, blood in stool, change  in bowel habit, diarrhea, nausea and vomiting.   Genitourinary:  Negative for difficulty urinating.   Musculoskeletal:  Negative for myalgias.   Integumentary:  Negative for wound.   Neurological:  Negative for headaches.   Psychiatric/Behavioral:  The patient is not nervous/anxious.          Current Medications:            Objective:        There were no vitals filed for this visit.    Physical Exam  Constitutional:       Appearance: Normal appearance.   HENT:      Head: Normocephalic and atraumatic.      Nose: Nose normal.      Mouth/Throat:      Mouth: Mucous membranes are moist.   Eyes:      Extraocular Movements: Extraocular movements intact.   Cardiovascular:      Rate and Rhythm: Normal rate and regular rhythm.      Pulses: Normal pulses.      Heart sounds: Normal heart sounds.   Pulmonary:      Effort: Pulmonary effort is normal.      Breath sounds: Normal breath sounds.   Abdominal:      General: Bowel sounds are normal.      Palpations: Abdomen is soft.   Musculoskeletal:         General: Normal range of motion.      Cervical back: Normal range of motion and neck supple.   Skin:     General: Skin is warm.      Capillary Refill: Capillary refill takes less than 2 seconds.   Neurological:      General: No focal deficit present.      Mental Status: She is alert and oriented to person, place, and time.   Psychiatric:         Mood and Affect: Mood normal.         Behavior: Behavior normal.             Lab Results   Component Value Date    WBC 5.46 04/07/2022    HGB 13.1 04/07/2022    HCT 40.0 04/07/2022     04/07/2022    CHOL 179 01/26/2022    TRIG 34 (L) 01/26/2022    HDL 91 (H) 01/26/2022     04/07/2022    K 4.3 04/07/2022     04/07/2022    CREATININE 0.97 04/07/2022    BUN 13 04/07/2022    CO2 27 04/07/2022    TSH 1.090 04/07/2022      Assessment:       No diagnosis found.    Plan:         Problem List Items Addressed This Visit    None        No follow-ups on file.    Jamar Hill MD      Instructed patient that if symptoms fail to improve or worsen patient should seek immediate medical attention or report to the nearest emergency department. Patient expressed verbal agreement and understanding to this plan of care.            [1]  No current outpatient medications on file.  [2]  Social History  Tobacco Use    Smoking status: Never     Passive exposure: Never    Smokeless tobacco: Never   Substance Use Topics    Alcohol use: Yes    Drug use: Never

## 2025-03-07 ENCOUNTER — OFFICE VISIT (OUTPATIENT)
Dept: PRIMARY CARE CLINIC | Facility: CLINIC | Age: 25
End: 2025-03-07
Payer: OTHER MISCELLANEOUS

## 2025-03-07 ENCOUNTER — RESULTS FOLLOW-UP (OUTPATIENT)
Dept: PRIMARY CARE CLINIC | Facility: CLINIC | Age: 25
End: 2025-03-07

## 2025-03-07 ENCOUNTER — TELEPHONE (OUTPATIENT)
Dept: PRIMARY CARE CLINIC | Facility: CLINIC | Age: 25
End: 2025-03-07
Payer: COMMERCIAL

## 2025-03-07 VITALS
OXYGEN SATURATION: 97 % | DIASTOLIC BLOOD PRESSURE: 65 MMHG | TEMPERATURE: 98 F | SYSTOLIC BLOOD PRESSURE: 116 MMHG | RESPIRATION RATE: 20 BRPM | WEIGHT: 174 LBS | BODY MASS INDEX: 29.71 KG/M2 | HEIGHT: 64 IN | HEART RATE: 56 BPM
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
## 2025-03-07 LAB
ALT SERPL W P-5'-P-CCNC: 27 U/L
HBV SURFACE AB SER-ACNC: NORMAL M[IU]/ML
HCV AB SER QL: NORMAL
HIV 1+O+2 AB SERPL QL: NORMAL
SYPHILIS AB INTERPRETATION: NORMAL

## 2025-03-07 PROCEDURE — 84460 ALANINE AMINO (ALT) (SGPT): CPT | Performed by: NURSE PRACTITIONER

## 2025-03-07 PROCEDURE — 86780 TREPONEMA PALLIDUM: CPT | Performed by: NURSE PRACTITIONER

## 2025-03-07 PROCEDURE — 87389 HIV-1 AG W/HIV-1&-2 AB AG IA: CPT | Performed by: NURSE PRACTITIONER

## 2025-03-07 PROCEDURE — 86706 HEP B SURFACE ANTIBODY: CPT | Performed by: NURSE PRACTITIONER

## 2025-03-07 PROCEDURE — 86803 HEPATITIS C AB TEST: CPT | Performed by: NURSE PRACTITIONER

## 2025-03-07 PROCEDURE — 36415 COLL VENOUS BLD VENIPUNCTURE: CPT | Performed by: NURSE PRACTITIONER

## 2025-03-07 NOTE — TELEPHONE ENCOUNTER
Results discussed with patient. I recommended a Hep B vaccine to her since she is not vaccinated. RTN to [facility] in 6 weeks.

## 2025-03-07 NOTE — PROGRESS NOTES
Subjective     Patient ID: Chloe Wallace is a 24 y.o. female.    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      Review of Systems   All other systems reviewed and are negative.         Objective     Physical Exam  Vitals and nursing note reviewed.   Constitutional:       Appearance: Normal appearance.   HENT:      Head: Normocephalic.   Eyes:      Pupils: Pupils are equal, round, and reactive to light.   Cardiovascular:      Rate and Rhythm: Normal rate and regular rhythm.      Heart sounds: Normal heart sounds.   Pulmonary:      Effort: Pulmonary effort is normal.      Breath sounds: Normal breath sounds.   Musculoskeletal:         General: Normal range of motion.      Cervical back: Normal range of motion.   Skin:     General: Skin is warm and dry.   Neurological:      General: No focal deficit present.      Mental Status: She is alert and oriented to person, place, and time.   Psychiatric:         Attention and Perception: Attention and perception normal.         Mood and Affect: Mood normal.         Speech: Speech normal.         Behavior: Behavior normal. Behavior is cooperative.         Cognition and Memory: Cognition normal.         Judgment: Judgment normal.        Assessment and Plan     1. Accident caused by hypodermic needle, initial encounter  -     ALT (SGPT)  -     Hepatitis B Surface Ab, Qualitative  -     Hepatitis C Antibody  -     HIV 1/2 Ag/Ab (4th Gen)  -     Treponema Pallidum (Syphillis) Antibody        [employer] full duty. RTN to [facility] 4/21/25 for 6 week lab work.          No follow-ups on file.  Patient ID: Chloe Wallace is a 24 y.o. female.    Chief Complaint: Work Related Injury (Patient here today with work related injury to right  thumb ( needle stick) that occurred on 3/6/25.)    History of Present Illness              Physical Exam              Assessment & Plan               1. Accident caused by hypodermic needle, initial encounter  -     ALT (SGPT)  -     Hepatitis B Surface Ab, Qualitative  -     Hepatitis C Antibody  -     HIV 1/2 Ag/Ab (4th Gen)  -     Treponema Pallidum (Syphillis) Antibody        No follow-ups on file.    This note was generated with the assistance of ambient listening technology. Verbal consent was obtained by the patient and accompanying visitor(s) for the recording of patient appointment to facilitate this note. I attest to having reviewed and edited the generated note for accuracy, though some syntax or spelling errors may persist. Please contact the author of this note for any clarification.

## 2025-05-16 ENCOUNTER — OFFICE VISIT (OUTPATIENT)
Dept: FAMILY MEDICINE | Facility: CLINIC | Age: 25
End: 2025-05-16
Payer: COMMERCIAL

## 2025-05-16 VITALS
WEIGHT: 175.81 LBS | SYSTOLIC BLOOD PRESSURE: 113 MMHG | DIASTOLIC BLOOD PRESSURE: 76 MMHG | TEMPERATURE: 98 F | OXYGEN SATURATION: 98 % | HEART RATE: 56 BPM | HEIGHT: 64 IN | RESPIRATION RATE: 18 BRPM | BODY MASS INDEX: 30.02 KG/M2

## 2025-05-16 DIAGNOSIS — N89.8 VAGINAL DISCHARGE: Primary | ICD-10-CM

## 2025-05-16 DIAGNOSIS — N76.0 ACUTE VAGINITIS: ICD-10-CM

## 2025-05-16 LAB
BILIRUB SERPL-MCNC: ABNORMAL MG/DL
BLOOD URINE, POC: ABNORMAL
CHLAMYDIA BY PCR: NEGATIVE
CLARITY, UA: CLEAR
COLOR, UA: YELLOW
GLUCOSE UR QL STRIP: ABNORMAL
KETONES UR QL STRIP: ABNORMAL
LEUKOCYTE ESTERASE URINE, POC: ABNORMAL
N. GONORRHOEAE (GC) BY PCR: NEGATIVE
NITRITE, POC UA: ABNORMAL
PH, POC UA: 7
PROTEIN, POC: ABNORMAL
SPECIFIC GRAVITY, POC UA: 1.02
TRICHOMONAS NAT: NEGATIVE
UROBILINOGEN, POC UA: 0.2

## 2025-05-16 PROCEDURE — 3074F SYST BP LT 130 MM HG: CPT | Mod: CPTII,,, | Performed by: FAMILY MEDICINE

## 2025-05-16 PROCEDURE — 87591 N.GONORRHOEAE DNA AMP PROB: CPT | Mod: ,,, | Performed by: CLINICAL MEDICAL LABORATORY

## 2025-05-16 PROCEDURE — 96372 THER/PROPH/DIAG INJ SC/IM: CPT | Mod: ,,, | Performed by: FAMILY MEDICINE

## 2025-05-16 PROCEDURE — 99213 OFFICE O/P EST LOW 20 MIN: CPT | Mod: 25,,, | Performed by: FAMILY MEDICINE

## 2025-05-16 PROCEDURE — 87491 CHLMYD TRACH DNA AMP PROBE: CPT | Mod: ,,, | Performed by: CLINICAL MEDICAL LABORATORY

## 2025-05-16 PROCEDURE — 3078F DIAST BP <80 MM HG: CPT | Mod: CPTII,,, | Performed by: FAMILY MEDICINE

## 2025-05-16 PROCEDURE — 3008F BODY MASS INDEX DOCD: CPT | Mod: CPTII,,, | Performed by: FAMILY MEDICINE

## 2025-05-16 PROCEDURE — 87661 TRICHOMONAS VAGINALIS AMPLIF: CPT | Mod: ,,, | Performed by: CLINICAL MEDICAL LABORATORY

## 2025-05-16 PROCEDURE — 1159F MED LIST DOCD IN RCRD: CPT | Mod: CPTII,,, | Performed by: FAMILY MEDICINE

## 2025-05-16 RX ORDER — AZITHROMYCIN 500 MG/1
1000 TABLET, FILM COATED ORAL ONCE
Qty: 2 TABLET | Refills: 0 | Status: SHIPPED | OUTPATIENT
Start: 2025-05-16 | End: 2025-05-17

## 2025-05-16 RX ORDER — CEFTRIAXONE 1 G/1
1 INJECTION, POWDER, FOR SOLUTION INTRAMUSCULAR; INTRAVENOUS
Status: COMPLETED | OUTPATIENT
Start: 2025-05-16 | End: 2025-05-16

## 2025-05-16 RX ORDER — METRONIDAZOLE 500 MG/1
500 TABLET ORAL EVERY 12 HOURS
Qty: 14 TABLET | Refills: 0 | Status: SHIPPED | OUTPATIENT
Start: 2025-05-16 | End: 2025-05-23

## 2025-05-16 RX ADMIN — CEFTRIAXONE 1 G: 1 INJECTION, POWDER, FOR SOLUTION INTRAMUSCULAR; INTRAVENOUS at 10:05

## 2025-05-19 ENCOUNTER — RESULTS FOLLOW-UP (OUTPATIENT)
Dept: FAMILY MEDICINE | Facility: CLINIC | Age: 25
End: 2025-05-19

## 2025-09-04 ENCOUNTER — OFFICE VISIT (OUTPATIENT)
Dept: OBSTETRICS AND GYNECOLOGY | Facility: CLINIC | Age: 25
End: 2025-09-04
Payer: COMMERCIAL

## 2025-09-04 VITALS
WEIGHT: 176.88 LBS | DIASTOLIC BLOOD PRESSURE: 62 MMHG | RESPIRATION RATE: 16 BRPM | SYSTOLIC BLOOD PRESSURE: 127 MMHG | HEART RATE: 81 BPM | BODY MASS INDEX: 30.2 KG/M2 | HEIGHT: 64 IN | OXYGEN SATURATION: 99 %

## 2025-09-04 DIAGNOSIS — Z11.4 SCREENING FOR HIV (HUMAN IMMUNODEFICIENCY VIRUS): ICD-10-CM

## 2025-09-04 DIAGNOSIS — Z01.419 ENCOUNTER FOR WELL WOMAN EXAM WITH ROUTINE GYNECOLOGICAL EXAM: Primary | ICD-10-CM

## 2025-09-04 DIAGNOSIS — Z72.51 HIGH RISK HETEROSEXUAL BEHAVIOR: ICD-10-CM

## 2025-09-04 DIAGNOSIS — Z11.3 SCREEN FOR SEXUALLY TRANSMITTED DISEASES: ICD-10-CM

## 2025-09-04 LAB
BACTERIAL VAGINOSIS DNA (OHS): NEGATIVE
CANDIDA GLABRATA/KRUSEI DNA (OHS): NOT DETECTED
CANDIDA SPECIES DNA (OHS): NOT DETECTED
CHLAMYDIA BY PCR: NEGATIVE
HAV IGM SER QL: NORMAL
HBV CORE IGM SER QL: NORMAL
HBV SURFACE AG SERPL QL IA: NORMAL
HCV AB SER QL: NORMAL
HIV 1+O+2 AB SERPL QL: NORMAL
N. GONORRHOEAE (GC) BY PCR: NEGATIVE
SYPHILIS AB INTERPRETATION: NORMAL
TRICHOMONAS VAGINALIS DNA (OHS): NOT DETECTED

## 2025-09-04 PROCEDURE — 80074 ACUTE HEPATITIS PANEL: CPT | Mod: ,,, | Performed by: CLINICAL MEDICAL LABORATORY

## 2025-09-04 PROCEDURE — 86780 TREPONEMA PALLIDUM: CPT | Mod: ,,, | Performed by: CLINICAL MEDICAL LABORATORY

## 2025-09-04 PROCEDURE — 87389 HIV-1 AG W/HIV-1&-2 AB AG IA: CPT | Mod: ,,, | Performed by: CLINICAL MEDICAL LABORATORY

## 2025-09-04 PROCEDURE — 87491 CHLMYD TRACH DNA AMP PROBE: CPT | Mod: ,,, | Performed by: CLINICAL MEDICAL LABORATORY

## 2025-09-04 PROCEDURE — 81515 NFCT DS BV&VAGINITIS DNA ALG: CPT | Mod: QW,,, | Performed by: CLINICAL MEDICAL LABORATORY

## 2025-09-04 PROCEDURE — 87591 N.GONORRHOEAE DNA AMP PROB: CPT | Mod: ,,, | Performed by: CLINICAL MEDICAL LABORATORY

## (undated) DEVICE — CULTURETTE ANAEROBIC COLLECTOR

## (undated) DEVICE — SPONGE GAUZE 4X4 12 PLY STL AMD 10/TRAY

## (undated) DEVICE — GLOVE SURGICAL PROTEXIS PI SIZE 8.0

## (undated) DEVICE — GOWN SURGICAL SMARTGOWN LEVEL 4 / EXTRA LARGE STERILE

## (undated) DEVICE — GLOVE SURGICAL PROTEXIS PI SIZE 6.5

## (undated) DEVICE — Device

## (undated) DEVICE — CDS BASIC

## (undated) DEVICE — TRAY SKIN PREP PROVIDONE IODINE STERILE LATEX FREE

## (undated) DEVICE — CULTURETTE AEROBIC SWAB

## (undated) DEVICE — SOL IRRIGATION SALINE 0.9% 1000ML BOTTLE

## (undated) DEVICE — GLOVE SURGICAL PROTEXIS PI BLUE SIZE 7.0